# Patient Record
Sex: MALE | Race: WHITE | NOT HISPANIC OR LATINO | Employment: OTHER | URBAN - METROPOLITAN AREA
[De-identification: names, ages, dates, MRNs, and addresses within clinical notes are randomized per-mention and may not be internally consistent; named-entity substitution may affect disease eponyms.]

---

## 2018-03-09 ENCOUNTER — OFFICE VISIT (OUTPATIENT)
Dept: MEDICAL GROUP | Facility: PHYSICIAN GROUP | Age: 68
End: 2018-03-09
Payer: MEDICARE

## 2018-03-09 ENCOUNTER — HOSPITAL ENCOUNTER (OUTPATIENT)
Dept: LAB | Facility: MEDICAL CENTER | Age: 68
End: 2018-03-09
Attending: NURSE PRACTITIONER
Payer: MEDICARE

## 2018-03-09 VITALS
BODY MASS INDEX: 37.03 KG/M2 | WEIGHT: 250 LBS | DIASTOLIC BLOOD PRESSURE: 70 MMHG | HEART RATE: 65 BPM | OXYGEN SATURATION: 93 % | RESPIRATION RATE: 14 BRPM | TEMPERATURE: 98.4 F | HEIGHT: 69 IN | SYSTOLIC BLOOD PRESSURE: 126 MMHG

## 2018-03-09 DIAGNOSIS — R73.03 PRE-DIABETES: ICD-10-CM

## 2018-03-09 DIAGNOSIS — I25.2 HISTORY OF MI (MYOCARDIAL INFARCTION): ICD-10-CM

## 2018-03-09 DIAGNOSIS — I10 ESSENTIAL HYPERTENSION: ICD-10-CM

## 2018-03-09 DIAGNOSIS — G62.9 NEUROPATHY: ICD-10-CM

## 2018-03-09 DIAGNOSIS — E66.9 OBESITY (BMI 35.0-39.9 WITHOUT COMORBIDITY): ICD-10-CM

## 2018-03-09 DIAGNOSIS — G47.33 OSA ON CPAP: ICD-10-CM

## 2018-03-09 LAB
EST. AVERAGE GLUCOSE BLD GHB EST-MCNC: 117 MG/DL
HBA1C MFR BLD: 5.7 % (ref 0–5.6)

## 2018-03-09 PROCEDURE — 83036 HEMOGLOBIN GLYCOSYLATED A1C: CPT

## 2018-03-09 PROCEDURE — 36415 COLL VENOUS BLD VENIPUNCTURE: CPT

## 2018-03-09 PROCEDURE — 99204 OFFICE O/P NEW MOD 45 MIN: CPT | Performed by: NURSE PRACTITIONER

## 2018-03-09 RX ORDER — LISINOPRIL AND HYDROCHLOROTHIAZIDE 20; 12.5 MG/1; MG/1
1 TABLET ORAL DAILY
COMMUNITY
End: 2018-06-07 | Stop reason: SDUPTHER

## 2018-03-09 RX ORDER — CLOPIDOGREL BISULFATE 75 MG/1
75 TABLET ORAL DAILY
COMMUNITY
End: 2018-06-07 | Stop reason: SDUPTHER

## 2018-03-09 RX ORDER — CHLORAL HYDRATE 500 MG
1000 CAPSULE ORAL 2 TIMES DAILY
COMMUNITY

## 2018-03-09 ASSESSMENT — PATIENT HEALTH QUESTIONNAIRE - PHQ9: CLINICAL INTERPRETATION OF PHQ2 SCORE: 0

## 2018-03-09 NOTE — PROGRESS NOTES
"Choco Chavarria is a 67 y.o. male here today to establish care and for evaluation and management of:    HPI:  Retired [employer]   Essential hypertension  Taking lisinopril/hctz daily.  No chest pain, shortness of breath.  40+ year smoker.  Quit 2 years ago.     Pre-diabetes  Has been told that he was \"prediabetes\". Awaiting labs.     MANNY on CPAP  Started CPAP 2 years ago.  Feels much better with this treatment.  Sleeping better.     Neuropathy (CMS-[facility])  Reports improvement with B6.  Awaiting A1c    Obesity (BMI 35.0-39.9 without comorbidity) ([facility])  BMI 36.92 today. Not currently exercising. Having a difficult time with portion control. Recommended my [email or web address] and 150 minutes of exercise in a week.    History of MI (myocardial infarction)  Patient has vague history and difficulty explaining to me about his MI. He was started on eloquent. I have requested records. No current chest pain shortness of breath ankle edema reported.      Current medicines (including changes today)  Current Outpatient Prescriptions   Medication Sig Dispense Refill   • lisinopril-hydrochlorothiazide (PRINZIDE, ZESTORETIC) 20-12.5 MG per tablet Take 1 Tab by mouth every day.     • clopidogrel (PLAVIX) 75 MG Tab Take 75 mg by mouth every day.     • aspirin EC (ECOTRIN) 81 MG Tablet Delayed Response Take 81 mg by mouth every day.     • B Complex Vitamins (B COMPLEX PO) Take  by mouth.     • Omega-3 Fatty Acids (FISH OIL) 1000 MG Cap capsule Take 1,000 mg by mouth 2 Times a Day.       No current facility-administered medications for this visit.        He  has a past medical history of Heart attack and Hypertension.    He  has a past surgical history that includes appendectomy.    Social History   Substance Use Topics   • Smoking status: Former Smoker     Years: 40.00     Types: Cigarettes     Quit date: 3/9/2016   • Smokeless tobacco: Never Used   • Alcohol use Yes      Comment: rare       Social History     Social History Narrative   • No " "narrative on file       Family History   Problem Relation Age of Onset   • Cancer Mother    • Lung Disease Mother    • Psychiatry Father        Family Status   Relation Status   • Mother    • Father          ROS  As stated in hpi  All other systems reviewed and are negative     Objective:     Blood pressure 126/70, pulse 65, temperature 36.9 °C (98.4 °F), resp. rate 14, height 1.753 m (5' 9\"), weight 113.4 kg (250 lb), SpO2 93 %. Body mass index is 36.92 kg/m².  Physical Exam:    Constitutional: Alert, no distress.  Skin: Warm, dry, good turgor, no rashes in visible areas.  Eye: Equal, round and reactive, conjunctiva clear, lids normal.  ENMT: Lips without lesions, good dentition, oropharynx clear.  Neck: Trachea midline, no masses, no thyromegaly. No cervical or supraclavicular lymphadenopathy.  Respiratory: Unlabored respiratory effort, lungs clear to auscultation, no wheezes, no ronchi.  Cardiovascular: Normal S1, S2, no murmur, no edema.  Abdomen: Soft, non-tender, no masses, no hepatosplenomegaly.  Psych: Alert and oriented x3, normal affect and mood.        Assessment and Plan:   The following treatment plan was discussed    1. Essential hypertension  This is a new problem to me. Chronic. Stable. Blood pressure at goal. Continue lisinopril hydrochlorothiazide daily. ED precautions reviewed. Recheck request records from previous PCP. 2 check labs. Monitor and follow.    2. Pre-diabetes  This is a new problem to me. Chronic. Unknown status. Will check an A1c and then developed a lifestyle management plan based on that. Monitor and follow closely.  - HEMOGLOBIN A1C; Future    3. History of MI (myocardial infarction)  This is a new problem to me. Historical issue. Patient has a very vague history. He states he is on eloquent since that heart attack. I have requested records to try and understand the course of this historical issue. Patient is also taking a daily aspirin. Also taking fish oil. He is " not currently taking a statin. Monitor records when they come in.    4. MANNY on CPAP  This is a new problem to me. Chronic. Stable. Continue using CPAP nightly.    5. Neuropathy (CMS-HCC)  This is a new problem to me. Chronic. Stable. Improvement with B6 vitamins. No pain at night minimal numbness. Monitor and follow.    6. Obesity (BMI 35.0-39.9 without comorbidity)  This is a problem to me. Chronic. Ongoing issue. Discussed diet and exercise. Handout regarding my [email or web address] given. Recommended 150 minutes of weekly exercise.  - Patient identified as having weight management issue.  Appropriate orders and counseling given.      Records requested.  Followup: Return in about 1 year (around 3/9/2019) for Annual.

## 2018-03-09 NOTE — LETTER
[facility]  [facility]  [address]  [place] [address]  Fax: [phone]   Authorization for Release/Disclosure of   Protected Health Information   Name: LAWRENCE CHAVARRIA : 1950 SSN: [id number]   Address: [address] [place] [address] [place] [address] Phone:    [phone] (home)    I authorize the entity listed below to release/disclose the PHI below to:   [facility]   Provider or Entity Name:  Champ Demarco   Address   City, State, [place], [place] Phone:      Fax:     Reason for request: continuity of care   Information to be released:    [x  ] LAST COLONOSCOPY,  including any PATH REPORT and follow-up  [  ] LAST FIT/COLOGUARD RESULT [  ] LAST DEXA  [  ] LAST MAMMOGRAM  [  ] LAST PAP  [x  ] LAST LABS [  ] RETINA EXAM REPORT  [  ] IMMUNIZATION RECORDS  [ x ] Release all info      [  ] Check here and initial the line next to each item to release ALL health information INCLUDING  _____ Care and treatment for drug and / or alcohol abuse  _____ HIV testing, infection status, or AIDS  _____ Genetic Testing    DATES OF SERVICE OR TIME PERIOD TO BE DISCLOSED: _____________  I understand and acknowledge that:  * This Authorization may be revoked at any time by you in writing, except if your health information has already been used or disclosed.  * Your health information that will be used or disclosed as a result of you signing this authorization could be re-disclosed by the recipient. If this occurs, your re-disclosed health information may no longer be protected by State or Federal laws.  * You may refuse to sign this Authorization. Your refusal will not affect your ability to obtain treatment.  * This Authorization becomes effective upon signing and will  on (date) __________.      If no date is indicated, this Authorization will  one (1) year from the signature date.    Name: Lawrence Chavarria    Signature:   Date:     3/9/2018            PLEASE FAX REQUESTED RECORDS BACK TO: [phone]

## 2018-03-09 NOTE — ASSESSMENT & PLAN NOTE
Patient has vague history and difficulty explaining to me about his MI. He was started on eloquent. I have requested records. No current chest pain shortness of breath ankle edema reported.

## 2018-03-09 NOTE — ASSESSMENT & PLAN NOTE
BMI 36.92 today. Not currently exercising. Having a difficult time with portion control. Recommended my [email or web address] and 150 minutes of exercise in a week.

## 2018-03-09 NOTE — ASSESSMENT & PLAN NOTE
Taking lisinopril/hctz daily.  No chest pain, shortness of breath.  40+ year smoker.  Quit 2 years ago.

## 2018-03-12 ENCOUNTER — TELEPHONE (OUTPATIENT)
Dept: MEDICAL GROUP | Facility: PHYSICIAN GROUP | Age: 68
End: 2018-03-12

## 2018-03-12 NOTE — LETTER
"March 12, 2018         Choco Chavarria  [address] [facility] [address]        Choco  The A1c test came back at 5.7.  This is on the low end of \"pre-diabetes\", but is a good opportunity to make some lifestyle adjustments to improve your health.  Portion control, decreasing sweets and white carbohydrates, more veggies, daily exercising and weight loss are those hard things to do, but they will pay off.  Let me know if you have any questions or concerns.  [facility] Orders   HEMOGLOBIN A1C   Result Value Ref Range    Glycohemoglobin 5.7 (H) 0.0 - 5.6 %      Comment:      Increased risk for diabetes:  5.7 -6.4%  Diabetes:  >6.4%  Glycemic control for adults with diabetes:  <7.0%  The above interpretations are per ADA guidelines.  Diagnosis  of diabetes mellitus on the basis of elevated Hemoglobin A1c  should be confirmed by repeating the Hb A1c test.      Est Avg Glucose 117 mg/dL      Comment:      The eAG calculation is based on the A1c-Derived Daily Glucose  (ADAG) study.  See the ADA's website for additional information.           Electronically Signed  "

## 2018-03-12 NOTE — TELEPHONE ENCOUNTER
"----- Message from [facility] sent at 3/12/2018  8:39 AM PDT -----  Choco  The A1c test came back at 5.7.  This is on the low end of \"pre-diabetes\", but is a good opportunity to make some lifestyle adjustments to improve your health.  Portion control, decreasing sweets and white carbohydrates, more veggies, daily exercising and weight loss are those hard things to do, but they will pay off.  Let me know if you have any questions or concerns.  [facility]    "

## 2018-03-15 ENCOUNTER — PATIENT MESSAGE (OUTPATIENT)
Dept: MEDICAL GROUP | Facility: PHYSICIAN GROUP | Age: 68
End: 2018-03-15

## 2018-06-07 ENCOUNTER — OFFICE VISIT (OUTPATIENT)
Dept: MEDICAL GROUP | Facility: PHYSICIAN GROUP | Age: 68
End: 2018-06-07
Payer: MEDICARE

## 2018-06-07 VITALS
WEIGHT: 247 LBS | DIASTOLIC BLOOD PRESSURE: 82 MMHG | HEIGHT: 69 IN | HEART RATE: 68 BPM | BODY MASS INDEX: 36.58 KG/M2 | OXYGEN SATURATION: 94 % | TEMPERATURE: 98.2 F | SYSTOLIC BLOOD PRESSURE: 134 MMHG

## 2018-06-07 DIAGNOSIS — E78.5 DYSLIPIDEMIA: ICD-10-CM

## 2018-06-07 DIAGNOSIS — I25.2 HISTORY OF MI (MYOCARDIAL INFARCTION): ICD-10-CM

## 2018-06-07 DIAGNOSIS — I10 ESSENTIAL HYPERTENSION: ICD-10-CM

## 2018-06-07 DIAGNOSIS — Z12.5 SCREENING PSA (PROSTATE SPECIFIC ANTIGEN): ICD-10-CM

## 2018-06-07 DIAGNOSIS — R73.03 PRE-DIABETES: ICD-10-CM

## 2018-06-07 PROCEDURE — 99214 OFFICE O/P EST MOD 30 MIN: CPT | Performed by: FAMILY MEDICINE

## 2018-06-07 RX ORDER — CLOPIDOGREL BISULFATE 75 MG/1
75 TABLET ORAL DAILY
Qty: 90 TAB | Refills: 0 | Status: SHIPPED | OUTPATIENT
Start: 2018-06-07 | End: 2018-12-09 | Stop reason: SDUPTHER

## 2018-06-07 RX ORDER — LISINOPRIL AND HYDROCHLOROTHIAZIDE 20; 12.5 MG/1; MG/1
1 TABLET ORAL DAILY
Qty: 90 TAB | Refills: 0 | Status: SHIPPED | OUTPATIENT
Start: 2018-06-07 | End: 2018-09-04 | Stop reason: SDUPTHER

## 2018-06-07 NOTE — ASSESSMENT & PLAN NOTE
This problem is new to me; patient reports that he was told he had a heart attack and was placed on Plavix as a preventative anticoagulant. Currently denies any chest pain or heart palpitations

## 2018-06-07 NOTE — PROGRESS NOTES
Subjective:   Choco Chavarria is a 67 y.o. male here today for hypertension, prediabetes, history of MI, elevated lipids    Essential hypertension  This problem is new to me; patient currently taking lisinopril/hydrochlorothiazide 20/12.5 mg every other day; currently denies any headache, dizziness, chest pain; current blood pressure is slightly above the recommended range    Pre-diabetes  This problem is new to me; review of patient's chart shows that his most recent hemoglobin A1c is 5.7. Patient reports that he is aware of this number and instructed to make some lifestyle changes.    History of MI (myocardial infarction)  This problem is new to me; patient reports that he was told he had a heart attack and was placed on Plavix as a preventative anticoagulant. Currently denies any chest pain or heart palpitations    Dyslipidemia  This problem is new to me. Patient reports that he is uncertain whether or not he has had any issues of elevated lipids in the past. Given that he has risk factors of previous heart attack and is currently being treated for high blood pressure, patient should be evaluated for lipid issues and be treated appropriately     Patient usually sees ARGELIA Ponce    Current medicines (including changes today)  Current Outpatient Prescriptions   Medication Sig Dispense Refill   • clopidogrel (PLAVIX) 75 MG Tab Take 1 Tab by mouth every day. 90 Tab 0   • lisinopril-hydrochlorothiazide (PRINZIDE, ZESTORETIC) 20-12.5 MG per tablet Take 1 Tab by mouth every day. 90 Tab 0   • aspirin EC (ECOTRIN) 81 MG Tablet Delayed Response Take 81 mg by mouth every day.     • B Complex Vitamins (B COMPLEX PO) Take  by mouth.     • Omega-3 Fatty Acids (FISH OIL) 1000 MG Cap capsule Take 1,000 mg by mouth 2 Times a Day.       No current facility-administered medications for this visit.      He  has a past medical history of Heart attack (HCC) and Hypertension.    ROS   No chest pain, no shortness of breath, no abdominal  "pain       Objective:     Blood pressure 134/82, pulse 68, temperature 36.8 °C (98.2 °F), height 1.753 m (5' 9\"), weight 112 kg (247 lb), SpO2 94 %. Body mass index is 36.48 kg/m².   Physical Exam:  Alert, oriented in no acute distress.  Eye contact is good, speech goal directed, affect calm  HEENT: conjunctiva non-injected, sclera non-icteric.  Pinna normal. TM pearly gray.   Oral mucous membranes pink and moist with no lesions.  Neck No adenopathy or masses in the neck or supraclavicular regions.  Lungs: clear to auscultation bilaterally with good excursion.  CV: regular rate and rhythm. Mild systolic ejection murmur  Abdomen: soft, nontender, No CVAT  Ext: no edema, color normal, vascularity normal, temperature normal        Assessment and Plan:   The following treatment plan was discussed     1. History of MI (myocardial infarction)      Stable. Continue Plavix; refill provided; monitor   2. Essential hypertension  COMP METABOLIC PANEL    MICROALBUMIN CREAT RATIO URINE    Stable. Patient will continue current medication every other day; reevaluation in 4 months by PCP   3. Pre-diabetes  HEMOGLOBIN A1C    Stable. Labs ordered for reevaluation in 4 months   4. Dyslipidemia  LIPID PROFILE    Stable. Labs ordered for reevaluation in 4 months   5. Screening PSA (prostate specific antigen)  PROSTATE SPECIFIC AG DIAGNOSTIC    Screening tests for further evaluation given patient's age       Followup: Return in about 4 months (around 10/7/2018) for lab/medication review, Short.            "

## 2018-06-07 NOTE — ASSESSMENT & PLAN NOTE
This problem is new to me. Patient reports that he is uncertain whether or not he has had any issues of elevated lipids in the past. Given that he has risk factors of previous heart attack and is currently being treated for high blood pressure, patient should be evaluated for lipid issues and be treated appropriately

## 2018-06-07 NOTE — ASSESSMENT & PLAN NOTE
This problem is new to me; patient currently taking lisinopril/hydrochlorothiazide 20/12.5 mg every other day; currently denies any headache, dizziness, chest pain; current blood pressure is slightly above the recommended range

## 2018-06-07 NOTE — ASSESSMENT & PLAN NOTE
This problem is new to me; review of patient's chart shows that his most recent hemoglobin A1c is 5.7. Patient reports that he is aware of this number and instructed to make some lifestyle changes.

## 2018-09-04 RX ORDER — LISINOPRIL AND HYDROCHLOROTHIAZIDE 20; 12.5 MG/1; MG/1
TABLET ORAL
Qty: 90 TAB | Refills: 0 | Status: SHIPPED | OUTPATIENT
Start: 2018-09-04 | End: 2019-03-11 | Stop reason: SDUPTHER

## 2018-09-05 NOTE — TELEPHONE ENCOUNTER
Requested Prescriptions     Signed Prescriptions Disp Refills   • lisinopril-hydrochlorothiazide (PRINZIDE, ZESTORETIC) 20-12.5 MG per tablet 90 Tab 0     Sig: TAKE ONE TABLET BY MOUTH EVERY DAY     Authorizing Provider: SALLIE SETH A.P.R.N.

## 2018-10-04 ENCOUNTER — HOSPITAL ENCOUNTER (OUTPATIENT)
Dept: LAB | Facility: MEDICAL CENTER | Age: 68
End: 2018-10-04
Attending: FAMILY MEDICINE
Payer: MEDICARE

## 2018-10-04 DIAGNOSIS — E78.5 DYSLIPIDEMIA: ICD-10-CM

## 2018-10-04 DIAGNOSIS — R73.03 PRE-DIABETES: ICD-10-CM

## 2018-10-04 DIAGNOSIS — I10 ESSENTIAL HYPERTENSION: ICD-10-CM

## 2018-10-04 DIAGNOSIS — Z12.5 SCREENING PSA (PROSTATE SPECIFIC ANTIGEN): ICD-10-CM

## 2018-10-04 LAB
ALBUMIN SERPL BCP-MCNC: 4.2 G/DL (ref 3.2–4.9)
ALBUMIN/GLOB SERPL: 1.1 G/DL
ALP SERPL-CCNC: 84 U/L (ref 30–99)
ALT SERPL-CCNC: 25 U/L (ref 2–50)
ANION GAP SERPL CALC-SCNC: 10 MMOL/L (ref 0–11.9)
AST SERPL-CCNC: 19 U/L (ref 12–45)
BILIRUB SERPL-MCNC: 0.6 MG/DL (ref 0.1–1.5)
BUN SERPL-MCNC: 21 MG/DL (ref 8–22)
CALCIUM SERPL-MCNC: 10.3 MG/DL (ref 8.5–10.5)
CHLORIDE SERPL-SCNC: 104 MMOL/L (ref 96–112)
CHOLEST SERPL-MCNC: 173 MG/DL (ref 100–199)
CO2 SERPL-SCNC: 24 MMOL/L (ref 20–33)
CREAT SERPL-MCNC: 0.89 MG/DL (ref 0.5–1.4)
CREAT UR-MCNC: 144.2 MG/DL
EST. AVERAGE GLUCOSE BLD GHB EST-MCNC: 117 MG/DL
GLOBULIN SER CALC-MCNC: 3.7 G/DL (ref 1.9–3.5)
GLUCOSE SERPL-MCNC: 87 MG/DL (ref 65–99)
HBA1C MFR BLD: 5.7 % (ref 0–5.6)
HDLC SERPL-MCNC: 39 MG/DL
LDLC SERPL CALC-MCNC: 107 MG/DL
MICROALBUMIN UR-MCNC: 2 MG/DL
MICROALBUMIN/CREAT UR: 14 MG/G (ref 0–30)
POTASSIUM SERPL-SCNC: 4.2 MMOL/L (ref 3.6–5.5)
PROT SERPL-MCNC: 7.9 G/DL (ref 6–8.2)
PSA SERPL-MCNC: 1.32 NG/ML (ref 0–4)
SODIUM SERPL-SCNC: 138 MMOL/L (ref 135–145)
TRIGL SERPL-MCNC: 137 MG/DL (ref 0–149)

## 2018-10-04 PROCEDURE — 80053 COMPREHEN METABOLIC PANEL: CPT

## 2018-10-04 PROCEDURE — 80061 LIPID PANEL: CPT

## 2018-10-04 PROCEDURE — 83036 HEMOGLOBIN GLYCOSYLATED A1C: CPT | Mod: GA

## 2018-10-04 PROCEDURE — 82570 ASSAY OF URINE CREATININE: CPT

## 2018-10-04 PROCEDURE — 36415 COLL VENOUS BLD VENIPUNCTURE: CPT

## 2018-10-04 PROCEDURE — 84153 ASSAY OF PSA TOTAL: CPT | Mod: GA

## 2018-10-04 PROCEDURE — 82043 UR ALBUMIN QUANTITATIVE: CPT

## 2018-10-10 ENCOUNTER — OFFICE VISIT (OUTPATIENT)
Dept: MEDICAL GROUP | Facility: PHYSICIAN GROUP | Age: 68
End: 2018-10-10
Payer: MEDICARE

## 2018-10-10 VITALS
RESPIRATION RATE: 14 BRPM | WEIGHT: 250 LBS | HEIGHT: 69 IN | BODY MASS INDEX: 37.03 KG/M2 | HEART RATE: 86 BPM | SYSTOLIC BLOOD PRESSURE: 128 MMHG | TEMPERATURE: 98.2 F | OXYGEN SATURATION: 92 % | DIASTOLIC BLOOD PRESSURE: 56 MMHG

## 2018-10-10 DIAGNOSIS — G62.9 NEUROPATHY: ICD-10-CM

## 2018-10-10 DIAGNOSIS — E78.5 DYSLIPIDEMIA: ICD-10-CM

## 2018-10-10 DIAGNOSIS — Z23 NEED FOR VACCINATION: ICD-10-CM

## 2018-10-10 DIAGNOSIS — R73.03 PRE-DIABETES: ICD-10-CM

## 2018-10-10 DIAGNOSIS — E66.9 OBESITY (BMI 35.0-39.9 WITHOUT COMORBIDITY): ICD-10-CM

## 2018-10-10 DIAGNOSIS — I10 ESSENTIAL HYPERTENSION: ICD-10-CM

## 2018-10-10 DIAGNOSIS — I25.2 HISTORY OF MI (MYOCARDIAL INFARCTION): ICD-10-CM

## 2018-10-10 PROCEDURE — 99214 OFFICE O/P EST MOD 30 MIN: CPT | Mod: 25 | Performed by: NURSE PRACTITIONER

## 2018-10-10 PROCEDURE — 90662 IIV NO PRSV INCREASED AG IM: CPT | Performed by: NURSE PRACTITIONER

## 2018-10-10 PROCEDURE — G0008 ADMIN INFLUENZA VIRUS VAC: HCPCS | Performed by: NURSE PRACTITIONER

## 2018-10-10 PROCEDURE — G0009 ADMIN PNEUMOCOCCAL VACCINE: HCPCS | Performed by: NURSE PRACTITIONER

## 2018-10-10 PROCEDURE — 90670 PCV13 VACCINE IM: CPT | Performed by: NURSE PRACTITIONER

## 2018-10-10 NOTE — ASSESSMENT & PLAN NOTE
Reports that neuropathy started when he started a statin. No reported pain at night.  States it is getting numb.  No pain in legs when walking.

## 2018-10-10 NOTE — PROGRESS NOTES
Chief Complaint   Patient presents with   • Follow-Up   • Results     labs    • Hypertension       Subjective:   Lawrence Major is a 67 y.o. male here today for evaluation and management of:    Essential hypertension  /56.  Currently taking lisinopril/hctz.  Taking every other day.     History of MI (myocardial infarction)  No reported swelling of ankles/shortness of breath reported.  Not started on beta blocker at that time.  Taking plavix and baby asa.     Neuropathy (CMS-HCC) ([facility])  Reports that neuropathy started when he started a statin. No reported pain at night.  States it is getting numb.  No pain in legs when walking.     Pre-diabetes  A1c 5.7 today.  Discussed diet, weight loss and exercise.  Goal to lose 12 pounds in the next 6 months.     Obesity (BMI 35.0-39.9 without comorbidity) ([facility])  BMi 36.92.  Discussed diet, exercise and weight loss.     Dyslipidemia  Cholesterol and triglycerides WNL.  HDL slightly low and LDL slightly elevated.  Discussed diet exercise and weight loss  Results for LAWRENCE MAJOR (MRN [id number]) as of 10/10/2018 10:21   Ref. Range 10/4/2018 13:52   Cholesterol,Tot Latest Ref Range: 100 - 199 mg/dL 173   Triglycerides Latest Ref Range: 0 - 149 mg/dL 137   HDL Latest Ref Range: >=40 mg/dL 39 (A)   LDL Latest Ref Range: <100 mg/dL 107 (H)          Current medicines (including changes today)  Current Outpatient Prescriptions   Medication Sig Dispense Refill   • lisinopril-hydrochlorothiazide (PRINZIDE, ZESTORETIC) 20-12.5 MG per tablet TAKE ONE TABLET BY MOUTH EVERY DAY 90 Tab 0   • clopidogrel (PLAVIX) 75 MG Tab Take 1 Tab by mouth every day. 90 Tab 0   • aspirin EC (ECOTRIN) 81 MG Tablet Delayed Response Take 81 mg by mouth every day.     • B Complex Vitamins (B COMPLEX PO) Take  by mouth.     • Omega-3 Fatty Acids (FISH OIL) 1000 MG Cap capsule Take 1,000 mg by mouth 2 Times a Day.       No current facility-administered medications for this visit.      He  has a past medical  "history of Heart attack (HCC) and Hypertension.    ROS as stated in hpi  No chest pain, no shortness of breath, no abdominal pain       Objective:     Blood pressure 128/56, pulse 86, temperature 36.8 °C (98.2 °F), temperature source Temporal, resp. rate 14, height 1.753 m (5' 9\"), weight 113.4 kg (250 lb), SpO2 92 %. Body mass index is 36.92 kg/m².   Physical Exam:  Constitutional: Alert, no distress.  Skin: Warm, dry, good turgor,no cyanosis, no rashes in visible areas.  Eye: Equal, round and reactive, conjunctiva clear, lids normal.  Ears: No tenderness, no discharge.  External canals are without any significant edema or erythema.   Gross auditory acuity is intact.  Nose: symmetrical without tenderness, no discharge.  Mouth/Throat: lips without lesion.  Oropharynx clear.  Neck: Trachea midline, no masses, no obvious thyroid enlargement.. No cervical or supraclavicular lymphadenopathy. Range of motion within normal limits.  Neuro: Cranial nerves 2-12 grossly intact.  No sensory deficit.  Respiratory: Unlabored respiratory effort, lungs clear to auscultation, no wheezes, no ronchi.  Cardiovascular: Normal S1, S2, no murmur, no edema.  Psych: Alert and oriented x3, normal affect and mood and judgement.        Assessment and Plan:   The following treatment plan was discussed    1. Need for vaccination  Due for vaccines.  No acute illness. Consent obtained.  I have placed the below orders and discussed them with an approved delegating provider.  The MA is performing the below orders under the direction of Dr. Gonzalez D.O. .    - INFLUENZA VACCINE, HIGH DOSE (65+ ONLY)  - Pneumococcal Conjugate Vaccine 13-Valent    2. Essential hypertension  Chronic, ongoing at goal.  Continue with lisinopril/hctz every other day.  Discussed exercise, weight loss role in controlling HTN.  Return in 6 months.     3. History of MI (myocardial infarction)  Chronic, ongoing. Stable.  Continue with plavix and ASA.  ED precautions reviewed.  " Monitor and follow.     4. Neuropathy (HCC)  Chronic, ongoing. Stable. Red flag warnings reviewed.  Monitor and follow.     5. Pre-diabetes  Chronic, A1c 5.7.  Discussed pre diabetes including weight loss, diet and exercise.  Return in 6 months for recheck of A1c and lifestyle adjustments.     6. Obesity (BMI 35.0-39.9 without comorbidity) (HCC)  Chronic, ongoing.  BMI 36.9.  Discussed diet, exercise and weight loss. Monitor and follow.     7. Dyslipidemia  Chronic, ongoing. Elevated LDL.  Discussed role of diet, exercise and weight loss.  Return in 6 months.  Monitor and follow.       Followup: Return in about 6 months (around 4/10/2019) for a1c.         Educated in proper administration of medication(s) ordered today including safety, possible SE, risks, benefits, rationale and alternatives to therapy.     Please note that this dictation was created using voice recognition software. I have made every reasonable attempt to correct obvious errors, but I expect that there are errors of grammar and possibly content that I did not discover before finalizing the note.

## 2018-10-10 NOTE — ASSESSMENT & PLAN NOTE
No reported swelling of ankles/shortness of breath reported.  Not started on beta blocker at that time.  Taking plavix and baby asa.

## 2018-10-10 NOTE — ASSESSMENT & PLAN NOTE
A1c 5.7 today.  Discussed diet, weight loss and exercise.  Goal to lose 12 pounds in the next 6 months.

## 2018-10-10 NOTE — ASSESSMENT & PLAN NOTE
Cholesterol and triglycerides WNL.  HDL slightly low and LDL slightly elevated.  Discussed diet exercise and weight loss  Results for LAWRENCE MAJOR (MRN [id number]) as of 10/10/2018 10:21   Ref. Range 10/4/2018 13:52   Cholesterol,Tot Latest Ref Range: 100 - 199 mg/dL 173   Triglycerides Latest Ref Range: 0 - 149 mg/dL 137   HDL Latest Ref Range: >=40 mg/dL 39 (A)   LDL Latest Ref Range: <100 mg/dL 107 (H)

## 2018-12-10 RX ORDER — CLOPIDOGREL BISULFATE 75 MG/1
TABLET ORAL
Qty: 90 TAB | Refills: 1 | Status: SHIPPED | OUTPATIENT
Start: 2018-12-10 | End: 2019-05-26 | Stop reason: SDUPTHER

## 2018-12-10 NOTE — TELEPHONE ENCOUNTER
Requested Prescriptions     Signed Prescriptions Disp Refills   • clopidogrel (PLAVIX) 75 MG Tab 90 Tab 1     Sig: TAKE ONE TABLET BY MOUTH EVERY DAY     Authorizing Provider: SALLIE SETH A.P.R.N.

## 2019-03-12 RX ORDER — LISINOPRIL AND HYDROCHLOROTHIAZIDE 20; 12.5 MG/1; MG/1
TABLET ORAL
Qty: 90 TAB | Refills: 0 | Status: SHIPPED | OUTPATIENT
Start: 2019-03-12 | End: 2019-05-26 | Stop reason: SDUPTHER

## 2019-03-28 ENCOUNTER — OFFICE VISIT (OUTPATIENT)
Dept: MEDICAL GROUP | Facility: PHYSICIAN GROUP | Age: 69
End: 2019-03-28
Payer: MEDICARE

## 2019-03-28 VITALS
BODY MASS INDEX: 36.14 KG/M2 | HEIGHT: 69 IN | HEART RATE: 66 BPM | TEMPERATURE: 98.2 F | WEIGHT: 244 LBS | RESPIRATION RATE: 14 BRPM | OXYGEN SATURATION: 95 % | DIASTOLIC BLOOD PRESSURE: 74 MMHG | SYSTOLIC BLOOD PRESSURE: 120 MMHG

## 2019-03-28 DIAGNOSIS — E66.9 OBESITY (BMI 35.0-39.9 WITHOUT COMORBIDITY): ICD-10-CM

## 2019-03-28 DIAGNOSIS — I25.2 HISTORY OF MI (MYOCARDIAL INFARCTION): ICD-10-CM

## 2019-03-28 DIAGNOSIS — R73.09 ELEVATED HEMOGLOBIN A1C: ICD-10-CM

## 2019-03-28 DIAGNOSIS — I10 ESSENTIAL HYPERTENSION: ICD-10-CM

## 2019-03-28 DIAGNOSIS — G47.33 OSA ON CPAP: ICD-10-CM

## 2019-03-28 DIAGNOSIS — Z86.010 HISTORY OF COLONIC POLYPS: ICD-10-CM

## 2019-03-28 PROBLEM — Z86.0100 HISTORY OF COLONIC POLYPS: Status: ACTIVE | Noted: 2019-03-28

## 2019-03-28 LAB
HBA1C MFR BLD: 5.4 % (ref 0–5.6)
INT CON NEG: NEGATIVE
INT CON POS: POSITIVE

## 2019-03-28 PROCEDURE — 83036 HEMOGLOBIN GLYCOSYLATED A1C: CPT | Performed by: NURSE PRACTITIONER

## 2019-03-28 PROCEDURE — 99213 OFFICE O/P EST LOW 20 MIN: CPT | Performed by: NURSE PRACTITIONER

## 2019-03-28 ASSESSMENT — PATIENT HEALTH QUESTIONNAIRE - PHQ9: CLINICAL INTERPRETATION OF PHQ2 SCORE: 0

## 2019-03-28 NOTE — LETTER
formerly [facility]  [facility]  [address]  [place] [address]  Fax: [phone]   Authorization for Release/Disclosure of   Protected Health Information   Name: LAWRENCE CHAVARRIA : 1950 SSN: [id number]   Address: [address] [place] [address] [place] [address] Phone:    [phone] (home)    I authorize the entity listed below to release/disclose the PHI below to:   [facility]   Provider or Entity Name:  Dr. Lawler or Mode   Address   City, State, [place], [place] Phone:      Fax:     Reason for request: continuity of care   Information to be released:    [ x ] LAST COLONOSCOPY,  including any PATH REPORT and follow-up  [  ] LAST FIT/COLOGUARD RESULT [  ] LAST DEXA  [  ] LAST MAMMOGRAM  [  ] LAST PAP  [  ] LAST LABS [  ] RETINA EXAM REPORT  [  ] IMMUNIZATION RECORDS  [  ] Release all info      [  ] Check here and initial the line next to each item to release ALL health information INCLUDING  _____ Care and treatment for drug and / or alcohol abuse  _____ HIV testing, infection status, or AIDS  _____ Genetic Testing    DATES OF SERVICE OR TIME PERIOD TO BE DISCLOSED: _____________  I understand and acknowledge that:  * This Authorization may be revoked at any time by you in writing, except if your health information has already been used or disclosed.  * Your health information that will be used or disclosed as a result of you signing this authorization could be re-disclosed by the recipient. If this occurs, your re-disclosed health information may no longer be protected by State or Federal laws.  * You may refuse to sign this Authorization. Your refusal will not affect your ability to obtain treatment.  * This Authorization becomes effective upon signing and will  on (date) __________.      If no date is indicated, this Authorization will  one (1) year from the signature date.    Name: Lawrence Chavarria    Signature:   Date:     3/28/2019            PLEASE FAX REQUESTED RECORDS BACK TO: [phone]

## 2019-03-28 NOTE — PROGRESS NOTES
"Chief Complaint   Patient presents with   • Follow-Up   • Hypertension   • Elevated Glucose       Subjective:   Choco Chavarria is a 68 y.o. male here today for evaluation and management of:    Essential hypertension  Taking lisinopril/hctz every other day.  BP at goal 120/74.  No chest pain, shortness of breath.     History of MI (myocardial infarction)  Taking 81 mg aspirin and plavix.  Cardiologist in [place], [place] is following.     MANNY on CPAP  Still wearing CPAP.    Obesity (BMI 35.0-39.9 without comorbidity) ([facility])  BMI today is 36.03.  Has lost 6 pounds and is working on vegg[employer].     History of colonic polyps  Reports he has been on the 5 year plan.  Benign.  Will request records today    Pre-diabetes  Today your A1c 5.4, normal range.            Current medicines (including changes today)  Current Outpatient Prescriptions   Medication Sig Dispense Refill   • lisinopril-hydrochlorothiazide (PRINZIDE, ZESTORETIC) 20-12.5 MG per tablet TAKE ONE TABLET BY MOUTH EVERY DAY 90 Tab 0   • clopidogrel (PLAVIX) 75 MG Tab TAKE ONE TABLET BY MOUTH EVERY DAY 90 Tab 1   • aspirin EC (ECOTRIN) 81 MG Tablet Delayed Response Take 81 mg by mouth every day.     • B Complex Vitamins (B COMPLEX PO) Take  by mouth.     • Omega-3 Fatty Acids (FISH OIL) 1000 MG Cap capsule Take 1,000 mg by mouth 2 Times a Day.       No current facility-administered medications for this visit.      He  has a past medical history of Elevated hemoglobin A1c; Heart attack (HCC); Hyperlipidemia; and Hypertension.    ROS as stated in hpi  No chest pain, no shortness of breath, no abdominal pain       Objective:     Blood pressure 120/74, pulse 66, temperature 36.8 °C (98.2 °F), temperature source Temporal, resp. rate 14, height 1.753 m (5' 9\"), weight 110.7 kg (244 lb), SpO2 95 %. Body mass index is 36.03 kg/m².   Physical Exam:  Constitutional: Alert, no distress.  Skin: Warm, dry, good turgor,no cyanosis, no rashes in visible areas.  Eye: Equal, round and " "reactive, conjunctiva clear, lids normal.  Ears: No tenderness, no discharge.  External canals are without any significant edema or erythema.  Tympanic membranes are without any inflammation, no effusion.  Gross auditory acuity is intact.  Nose: symmetrical without tenderness, no discharge.  Mouth/Throat: lips without lesion.  Oropharynx clear.  Throat without erythema, exudates or tonsillar enlargement.  Neck: Trachea midline, no masses, no obvious thyroid enlargement.. No cervical or supraclavicular lymphadenopathy. Range of motion within normal limits.  Neuro: Cranial nerves 2-12 grossly intact.  No sensory deficit.  Respiratory: Unlabored respiratory effort, lungs clear to auscultation, no wheezes, no ronchi.  Cardiovascular: Normal S1, S2, no murmur, no edema.  Abdomen: Soft, non-tender, no masses, no guarding,  no hepatosplenomegaly.  Psych: Alert and oriented x3, normal affect and mood and judgement.        Assessment and Plan:   The following treatment plan was discussed    1. Essential hypertension  Chronic, ongoing. Stable. At goal.  Continue with lisinopril/hctz and diet/exercise/weight loss plan.  Monitor and follow.     2. History of MI (myocardial infarction)  Chronic, ongoing.  Followed by cardiologist in [place].  Yearly appointment.  Stable.  Continue asa and plavix.   Red flags reviewed.     3. MANNY on CPAP  Chronic, ongoing. Stable.  Referral to pulmonology for updated supplies.  Continue cpap nightly.   - REFERRAL TO PULMONOLOGY    4. Obesity (BMI 35.0-39.9 without comorbidity) (HCC)  Chronic, ongoing, improving.  Continue diet and exercise.     5. History of colonic polyps  This is a new historical issue to me.  Chronic, request records from [place].  Patient is on \"5 year recall\" plan.  Monitor.       Followup: Return in about 1 year (around 3/28/2020) for Annual.         Educated in proper administration of medication(s) ordered today including safety, possible SE, risks, benefits, rationale and " alternatives to therapy.     Please note that this dictation was created using voice recognition software. I have made every reasonable attempt to correct obvious errors, but I expect that there are errors of grammar and possibly content that I did not discover before finalizing the note.

## 2019-04-24 ENCOUNTER — SLEEP CENTER VISIT (OUTPATIENT)
Dept: SLEEP MEDICINE | Facility: MEDICAL CENTER | Age: 69
End: 2019-04-24
Payer: MEDICARE

## 2019-04-24 ENCOUNTER — SLEEP STUDY (OUTPATIENT)
Dept: SLEEP MEDICINE | Facility: MEDICAL CENTER | Age: 69
End: 2019-04-24
Attending: INTERNAL MEDICINE
Payer: MEDICARE

## 2019-04-24 VITALS
DIASTOLIC BLOOD PRESSURE: 62 MMHG | RESPIRATION RATE: 16 BRPM | OXYGEN SATURATION: 96 % | WEIGHT: 245 LBS | SYSTOLIC BLOOD PRESSURE: 130 MMHG | BODY MASS INDEX: 36.29 KG/M2 | HEIGHT: 69 IN | HEART RATE: 59 BPM

## 2019-04-24 DIAGNOSIS — G47.33 OSA ON CPAP: ICD-10-CM

## 2019-04-24 DIAGNOSIS — I25.2 HISTORY OF MI (MYOCARDIAL INFARCTION): ICD-10-CM

## 2019-04-24 DIAGNOSIS — I10 ESSENTIAL HYPERTENSION: ICD-10-CM

## 2019-04-24 PROCEDURE — 95811 POLYSOM 6/>YRS CPAP 4/> PARM: CPT | Performed by: INTERNAL MEDICINE

## 2019-04-24 PROCEDURE — 99204 OFFICE O/P NEW MOD 45 MIN: CPT | Mod: 25 | Performed by: INTERNAL MEDICINE

## 2019-04-24 RX ORDER — ZOLPIDEM TARTRATE 5 MG/1
5 TABLET ORAL NIGHTLY PRN
Qty: 3 TAB | Refills: 0 | Status: SHIPPED | OUTPATIENT
Start: 2019-04-24 | End: 2019-04-26

## 2019-04-24 NOTE — PROGRESS NOTES
Chief Complaint   Patient presents with   • New Patient       HPI: This patient is a pleasant 68 y.o. Male who is referred by Dorothy Kim, PCP, for sleep apnea management.  He was diagnosed with MANNY in [place], [place] 16 years ago and prescribed CPAP: 12 cm of water.  He has been using his original CPAP machine which he feels works well, however needs new supplies.  His original sleep study results are unavailable.  His compliance card has not download data since 2016 and does not have AHI data.  He uses a fullface mask and has been purchasing supplies online.  In terms of sleep habits, he goes to bed around 11:30 PM, gets to sleep within 10 minutes and generally sleeps through the night. Occasionally he wakes up in the early morning and cannot resume sleep.  He gets up by 8 AM, feeling rested.  He denies any significant daytime hypersomnolence although sometimes nods off while reading in the evenings.  His BMI is 36.    Past Medical History:   Diagnosis Date   • Apnea, sleep    • Chickenpox    • Daytime sleepiness    • Elevated hemoglobin A1c    • Frequent urination    • Gasping for breath    • Heart attack (HCC)    • Hyperlipidemia    • Hypertension    • Influenza    • Mumps    • Obesity    • Ringing in ears    • Sleep apnea    • Snoring    • Wears glasses    • Weight loss        Social History     Social History   • Marital status:      Spouse name: N/A   • Number of children: N/A   • Years of education: N/A     Occupational History   • Not on file.     Social History Main Topics   • Smoking status: Former Smoker     Years: 40.00     Types: Cigarettes     Quit date: 3/9/2016   • Smokeless tobacco: Never Used   • Alcohol use Yes      Comment: rare   • Drug use: No   • Sexual activity: Yes     Partners: Female     Other Topics Concern   • Not on file     Social History Narrative   • No narrative on file       Family History   Problem Relation Age of Onset   • Cancer Mother    • Lung Disease Mother    •  "Psychiatry Father        Current Outpatient Prescriptions on File Prior to Visit   Medication Sig Dispense Refill   • lisinopril-hydrochlorothiazide (PRINZIDE, ZESTORETIC) 20-12.5 MG per tablet TAKE ONE TABLET BY MOUTH EVERY DAY 90 Tab 0   • clopidogrel (PLAVIX) 75 MG Tab TAKE ONE TABLET BY MOUTH EVERY DAY 90 Tab 1   • aspirin EC (ECOTRIN) 81 MG Tablet Delayed Response Take 81 mg by mouth every day.     • B Complex Vitamins (B COMPLEX PO) Take  by mouth.     • Omega-3 Fatty Acids (FISH OIL) 1000 MG Cap capsule Take 1,000 mg by mouth 2 Times a Day.       No current facility-administered medications on file prior to visit.        Allergies: Patient has no known allergies.    ROS:   Constitutional: Denies fevers, chills, night sweats, fatigue or weight loss  Eyes: Denies vision loss, pain, drainage, double vision  Ears, Nose, Throat: Denies earache, difficulty hearing, tinnitus, nasal congestion, hoarseness  Cardiovascular: Denies chest pain, tightness, palpitations, orthopnea or edema  Respiratory: Denies shortness of breath, cough, wheezing, hemoptysis  Sleep: Denies daytime sleepiness, snoring, apneas, insomnia, morning headaches  GI: Denies heartburn, dysphagia, nausea, abdominal pain, diarrhea or constipation  : Denies frequent urination, hematuria, discharge or painful urination  Musculoskeletal: Denies back pain, painful joints, sore muscles  Neurological: Denies weakness or headaches  Skin: No rashes    /62 (BP Location: Left arm, Patient Position: Sitting, BP Cuff Size: Adult)   Pulse (!) 59   Resp 16   Ht 1.753 m (5' 9\")   Wt 111.1 kg (245 lb)   SpO2 96%     Physical Exam:  Appearance: Well-nourished, well-developed, in no acute distress  HEENT: Normocephalic, atraumatic, white sclera, PERRLA, oropharynx clear, Mallampati 3  Neck: No adenopathy or masses  Respiratory: no intercostal retractions or accessory muscle use  Lungs auscultation: Clear to auscultation bilaterally  Cardiovascular: Regular " "rate rhythm. No murmurs, rubs or gallops.  No LE edema  Abdomen: soft, nondistended  Gait: Normal  Digits: No clubbing, cyanosis  Motor: No focal deficits  Orientation: Oriented to time, person and place    Diagnosis:  1. MANNY on CPAP  Polysomnography, 4 or More    zolpidem (AMBIEN) 5 MG Tab   2. Essential hypertension     3. BMI 36.0-36.9,adult  OBESITY COUNSELING (No Charge): Patient identified as having weight management issue.  Appropriate orders and counseling given.   4. History of MI (myocardial infarction)         Plan:  The patient has successfully been using CPAP therapy since 2003.  His CPAP is set at 12 cm of water.  He clinically shows good response to CPAP therapy however his machine is outdated and requires replacement.  He would like to establish with a DME for supplies.  As his original sleep study was in 2003, he requires an updated polysomnogram for evaluation of MANNY and accurate calibration of CPAP pressures.  Patient's body mass index is 36.18 kg/m². Exercise and nutrition counseling were performed at this visit.  Return for after sleep study.      Answers for HPI/ROS submitted by the patient on 4/24/2019   Year of your last physical exam: 2001  Results of exam: CPAP Machine for asleep Apnia  Occupation : Retired  Height: 5' 9\"  Current weight: 235  6 months ago: 250  At age 20: 185  What is the reason for your visit today?: Orientation and to check my CPAP Machine  Name of person referring you to the Sleep Center: Alverto Kim  Have you ever been hospitalized?: Yes  Reason, year, and hospital in which you were hospitalized:: [facility]  Have you ever had problems with anesthesia?: No  Have you experienced post-operative delirium?: No  Any complications with surgery?: No  What year did you receive your last Flu shot?: This year 2019  What year did you receive you last Pneumonia shot?: Did not receive this shot  Have you had a TB skin test? If so, please list the year and result:: " No  Have you had Allergy skin testing? If so, please list the year and result:: No  Please briefly describe your sleep problem and how old you were when it began.: Snored and did not sleep well - 2014  How does this affect your daily life and activities? Please also rate how serious of a problem this is (1 = Not at all, 10 = Very Serious).: Always tired = 8  Have you had any previous evaluations, examinations, or treatment for this sleep problem or any other problems with your sleep? If so, please describe the evaluation, treatment, and results.: Had sleep study done resulting in CPAP  Have you used any medications (prescribed or otherwise) to help your sleep problem? If yes, include name, amount, frequency, and the prescribing physician.: No, don't want drugs  If employed, what time do you usually start and end work?: N/A  Do you ever change work shifts? If yes, describe how often (never, infrequently, regularly).: N/A  What time do you usually go to bed and wake up on: Weekdays? Weekends?: @11:30 PM and wake @ 8 AM  Do you have a regular bed partner?: Yes  How many minutes does it usually take to fall asleep at night after turning off the lights?: 10 minutes or so  What do you ordinarily do just prior to turning out the lights and attempting to go to sleep (e.g., reading, TV, baths, etc.)?: Read or watch TV  On average, how many times do you wake up during the night?: None, but once waken by dogs, hard going back to sleep  On average, how many times do you wake up to use the bathroom?: Once in the morning  Do you often wake up too early in the morning and are unable to return to sleep?: Many times, maybe 4 times a week  On average, how many hours of sleep do you get per night?: 7 to 8 hours  How do you usually awaken?  Alarm, spontaneously, or other?: Routinely  Is it difficult for you to awaken and get out of bed after sleeping? (Not at all, Sometimes, Very): Not at all  Do you nap or return to bed after  arising?: Yes, I return to bed for about 30 minutes then get up.  Are you bothered by sleepiness during the day?: Sometimes, when I don't sleep very well the might before.  Do you feel that you get too much sleep at night?: No  Do you feel that you get too little sleep at night?: Sometimes  Do you usually feel tired during the day? If so, what do you attribute this to?: No, but I do get tired after working around the house on projects.  Do you find yourself falling asleep when you don't mean to? : Yes  If yes, how long does your sleep episode last?: Maybe 30 to 45 minutes  Do you feel rested or refreshed after the sleep episode?: Yes  Have you ever suddenly fallen?: No  Have you ever experienced sudden body weakness?: No  Was the weakness brought on by any particular event or feeling? If so, briefly describe.: I haven't felt weak  Have you ever experienced weakness or paralysis upon going to sleep?: No  Have you ever experienced weakness or paralysis upon awakening from sleep?: No  If yes for either of the above two questions, please indicate how many times per week does this occur?: N/A  Have you ever experienced seeing things or hearing voices/noises: That weren't real? On going to sleep? During the night? On awakening from sleep? During the day?: No seeing or hearing things that weren't real.  Do you have difficulty breathing at night? If yes, briefly describe.: Sometimes, before I started using CPAP Machine.  How many times per week?: Once or twice a week  How did you become aware of this, at what age did this first occur, and how many years has this occurred?: 2000, it woke me up and I was choking, Just a couple of years.  Have you been told you snore while asleep? If so, does it disturb a bed partner (or someone in the same room), or someone in the next room?: Yes, by my wife  Have you ever experienced doing something without being aware of the action? If yes, please describe.: No  How many times per week does  "this occur?: N/A  Have you ever experienced upon lying in bed before sleep or on awakening from sleep: Restlessness of legs, \"nervous legs\", \"creeping crawling\" sensation of legs, or twitching of legs?: No  How many times per week does this occur, and how many minutes does the sensation last?: N/A  At what age did this first occur, and how many years has this occurred?: N/A  Have you ever been told that your arms or legs jerk or twitch while you are asleep? If yes, how many times per night does this occur?: No  Do you know, or have you ever been told that you do any of the following while sleeping: talk, walk, grit teeth, wet the bed, wake up screaming or seemingly afraid, have disturbing dreams, have unusual movements, wake up with headaches, (males) have erections? If yes to any of these, please indicate how many times per week, age started, last occurrence, treatment received.: No  Has anyone in your family been known to have any sleep problems? If yes, please list the type of problem (e.g., trouble getting to sleep, too sleepy, bed wetting, etc.), the relationship of this person to you, and the treatment received.: No    "

## 2019-04-25 NOTE — PROCEDURES
The patient underwent a split night polysomnogram with a CPAP titration using the standard montage for measurement of paramaters of sleep, respiratory events, movement abnormalities, heart rate and rhythm.  A Microphone was used to monitior snoring.  Interpretation:  Study start time was 09:43:46 PM.  Total recording time was 3h 7.0m (187 minutes) with a total sleep time of 2h 23.5m (143 minutes) resulting in a sleep efficiency of 76.74%.  Sleep latency from the start fo the study was 04 minutes minutes and REM latency from sleep onset was 00 minutes minutes.  Respiratory:   There were 87 apneas in total consisting of 1 obstructive apneas, 0 mixed apneas, and 86 central apneas.  There were 134 hypopneas in total.  The apnea index was 36.38 per hour and the hypopnea index was 56.03 per hour.  The overall AHI was 92.4, with a REM AHI of 0.00, and a supine AHI of 88.59.  Limb Movements:  There were a total of 0 periodic leg movements, of which 0 were PLMS arousals.  This resulted in a PLMS index of 0.0 and a PLMS arousal index of 0.0  Oximetry:  The mean SaO2 was 88.0% for the diagnostic portion of the study, with a minimum SaO2 of 79.0%.    Treatment:  Interpretation:  Treatment recording time was 5h 6.5m (306 minutes) with a total sleep time of 4h 39.0m (279 minutes) resulting in a sleep efficiency of 91.0%.    Sleep latency from the start of treatment was 08 minutes minutes and REM latency from sleep onset was 3h 2.0m minutes.    The patient had 104 arousals in total for an arousal index of 22.4.  Respiratory:   There were 24 apneas in total consisting of  1 obstructive apneas, 23 central apneas, and 0 mixed apneas for an apnea index of 5.16.    The patient had 53 hypopneas in total, which resulted in a hypopnea index of 11.40.    The overall AHI was 16.56, with a REM AHI of 1.09, and a supine AHI of 18.31.     Limb Movements:  There were a total of 8 periodic leg movements, of which 2 were PLMS arousals.  This  resulted in a PLMS index of 1.7 and a PLMS arousal index of 0.4.  Oximetry:  The mean SaO2 during treatment was 92.0%, with a minimum oxygen saturation of 84.0%.  CPAP was tried from 8cm H2O to 12cm H2O.  Bilevel was tried at 15/11cm H2O.      RECORDING TECHNIQUE:       After the scalp was prepared, gold plated electrodes were applied to the scalp according to the International 10-20 System. EEG (electroencephalogram) was continuously monitored from the O1-M2, O2-M1, C3-M2, C4-M1, F3-M2, and F4-M1.   EOGs (electrooculograms) were monitored by electrodes placed at the left and right outer canthi.  Chin EMG (electromyogram) was monitored by electrodes placed on the mentalis and sub-mentalis muscles.  Nasal and oral airflow were monitored using a triple port thermocouple as well as oronasal pressure transducer.  Respiratory effort was measured by inductive plethysmography technology employing abdominal and thoracic belts.  Blood oxygen saturation and pulse were monitored by pulse oximetry.  Heart rhythm was monitored by surface electrocardiogram.  Leg EMG was studied using surface electrodes placed on left and right anterior tibialis.  A microphone was used to monitor tracheal sounds and snoring.  Body position was monitored and documented by technician observation      SUMMARY:    This was an overnight diagnostic polysomnogram with a subsequent positive airway pressure titration, also known as a split- night study.  The patient chose to use a medium air fit F 30 mask with heated humidification.    During the diagnostic phase the total recording time was 187 minutes, the sleep period time was 181 minutes, and the total sleep time was 143 minutes.  The patient's sleep efficiency was 76.74 % which is reduced.  The patient experienced 0 REM period(s).    The sleep stage durations revealed 39.5 minutes of wake after sleep onset (WASO), 50 minutes of N1 sleep, 93 minutes of N2 sleep, 0 minutes of N3 sleep, and 0 minutes of  REM.    The latency to sleep was 4 minutes which is shortened.  The latency to REM was not applicable as the patient did not achieve REM.  Severe sleep fragmentation occurred.  The arousal index was 49.  The Total Limb Movement (isolated) Index was 14.2, the Limb Movement with Arousal Index was 8.8, and the PLM Series Index was 0.    The patient experienced 86 central and 1 obstructive apneas, 83 central and 51 obstructive hypopneas, 221 apneas and hypopneas, and 0 RERAS.  The apnea hypopnea index was 92.4, the RDI was 92.4, the mean event duration was 16.1 seconds, and the longest event lasted 44.3 seconds.  The REM index was 0 and the supine index was 79.  The apnea hypopnea index represents severe obstructive sleep apnea hypopnea syndrome.  Central events accounted for 38 9% of the respiratory events during the diagnostic phase.    The ela saturation during sleep was 80 % and the patient spent 77.2 % of the recording with saturations less than or equal to 90%.    During sleep, the minimum heart rate was 55 bpm, the mean heart rate was 67 bpm, and the maximum heart rate was 81 bpm.    Once the patient met the split-night protocol, the technician performed a positive airway pressure titration.  The technician initiated treatment with CPAP set at 8 cmH2O and increased the pressure to a maximum of 12 cmH2O. the apnea hypopnea index failed to normalize on any tested CPAP pressure so the technician to place patient on bilevel 15/11 cm H2O.    The patient did best on bilevel 15/11 cm H2O with a resultant AHI of 0.65, a minimum saturation of 87 %, and a mean saturation of 92 %.  The patient was able to achieve supine REM on bilevel.      ASSESSMENT:    Severe obstructive sleep apnea hypopnea - AHI 92.4  Significant and persistent nocturnal desaturation - ela saturation 80 % - saturations <90% or 77.2% of the diagnostic  Successful bilevel titration to a final pressure 15/11 cm H2O with a resultant AHI 0.65, ela  saturation of 87%, mean saturation of 92%, and the achievement of supine REM sleep        RECOMMENDATION:    Recommend bilevel 15/11 cmH2O using medium air fit F 30 mask and heated humidification followed by data card and clinical review in 6-8 weeks.

## 2019-04-26 ENCOUNTER — SLEEP CENTER VISIT (OUTPATIENT)
Dept: SLEEP MEDICINE | Facility: MEDICAL CENTER | Age: 69
End: 2019-04-26
Payer: MEDICARE

## 2019-04-26 VITALS
BODY MASS INDEX: 36.32 KG/M2 | SYSTOLIC BLOOD PRESSURE: 104 MMHG | OXYGEN SATURATION: 95 % | WEIGHT: 245.2 LBS | HEIGHT: 69 IN | HEART RATE: 64 BPM | DIASTOLIC BLOOD PRESSURE: 64 MMHG | RESPIRATION RATE: 16 BRPM

## 2019-04-26 DIAGNOSIS — I10 ESSENTIAL HYPERTENSION: ICD-10-CM

## 2019-04-26 DIAGNOSIS — I25.2 HISTORY OF MI (MYOCARDIAL INFARCTION): ICD-10-CM

## 2019-04-26 DIAGNOSIS — G47.33 OSA (OBSTRUCTIVE SLEEP APNEA): ICD-10-CM

## 2019-04-26 PROCEDURE — 99213 OFFICE O/P EST LOW 20 MIN: CPT | Performed by: INTERNAL MEDICINE

## 2019-04-26 NOTE — PROGRESS NOTES
Chief Complaint   Patient presents with   • Results     SS RE       HPI: This patient is a pleasant 68 y.o. Male who returns for sleep study results He was diagnosed with MANNY in [place], [place] 16 years ago and prescribed CPAP: 12 cm of water.  He has been using his original CPAP machine which he feels works well, however needs new supplies.  In terms of sleep habits, he goes to bed around 11:30 PM, gets to sleep within 10 minutes and generally sleeps through the night. Occasionally he wakes up in the early morning and cannot resume sleep.  He gets up by 8 AM, feeling rested.  He denies any significant daytime hypersomnolence.  His BMI is 36.  Polysomnography in the sleep laboratory showed very severe MANNY with AHI 92/h associated with oxygen desaturations to 80%.  He was successfully titrated on BiPAP: 15/11 cm of water with resultant AHI of 0.6 and normal oximetry.    Past Medical History:   Diagnosis Date   • Apnea, sleep    • Chickenpox    • Daytime sleepiness    • Elevated hemoglobin A1c    • Frequent urination    • Gasping for breath    • Heart attack (HCC)    • Hyperlipidemia    • Hypertension    • Influenza    • Mumps    • Obesity    • Ringing in ears    • Sleep apnea    • Snoring    • Wears glasses    • Weight loss        Social History     Social History   • Marital status:      Spouse name: N/A   • Number of children: N/A   • Years of education: N/A     Occupational History   • Not on file.     Social History Main Topics   • Smoking status: Former Smoker     Years: 40.00     Types: Cigarettes     Quit date: 3/9/2016   • Smokeless tobacco: Never Used   • Alcohol use Yes      Comment: rare   • Drug use: No   • Sexual activity: Yes     Partners: Female     Other Topics Concern   • Not on file     Social History Narrative   • No narrative on file       Family History   Problem Relation Age of Onset   • Cancer Mother    • Lung Disease Mother    • Psychiatry Father        Current Outpatient  "Prescriptions on File Prior to Visit   Medication Sig Dispense Refill   • zolpidem (AMBIEN) 5 MG Tab Take 1 Tab by mouth at bedtime as needed for Sleep (1 to 3 po qhs prn insomnia/sleep study. Bring to sleep study.) for up to 2 days. 3 Tab 0   • lisinopril-hydrochlorothiazide (PRINZIDE, ZESTORETIC) 20-12.5 MG per tablet TAKE ONE TABLET BY MOUTH EVERY DAY 90 Tab 0   • clopidogrel (PLAVIX) 75 MG Tab TAKE ONE TABLET BY MOUTH EVERY DAY 90 Tab 1   • aspirin EC (ECOTRIN) 81 MG Tablet Delayed Response Take 81 mg by mouth every day.     • B Complex Vitamins (B COMPLEX PO) Take  by mouth.     • Omega-3 Fatty Acids (FISH OIL) 1000 MG Cap capsule Take 1,000 mg by mouth 2 Times a Day.       No current facility-administered medications on file prior to visit.        Allergies: Patient has no known allergies.    ROS:   Constitutional: Denies fevers, chills, night sweats, fatigue or weight loss  Eyes: Denies vision loss, pain, drainage, double vision  Ears, Nose, Throat: Denies earache, difficulty hearing, tinnitus, nasal congestion, hoarseness  Cardiovascular: Denies chest pain, tightness, palpitations, orthopnea or edema  Respiratory: Denies shortness of breath, cough, wheezing, hemoptysis  Sleep: As in HPI  GI: Denies heartburn, dysphagia, nausea, abdominal pain, diarrhea or constipation  : Denies frequent urination, hematuria, discharge or painful urination  Musculoskeletal: Denies back pain, painful joints, sore muscles  Neurological: Denies weakness or headaches  Skin: No rashes    /64 (BP Location: Left arm, Patient Position: Sitting, BP Cuff Size: Adult)   Pulse 64   Resp 16   Ht 1.753 m (5' 9\")   Wt 111.2 kg (245 lb 3.2 oz)   SpO2 95%     Physical Exam:  Appearance: Well-nourished, well-developed, in no acute distress  HEENT: Normocephalic, atraumatic, white sclera, equals equal, Mallampati 3  Neck: No masses  Respiratory: no intercostal retractions or accessory muscle use  Lungs auscultation: Verbal " wheezing  Cardiovascular: No LE edema  Abdomen: Nondistended  Gait: Normal  Digits: No clubbing, cyanosis  Motor: No focal deficits  Orientation: Oriented to time, person and place    Diagnosis:  1. MANNY (obstructive sleep apnea)  DME BiPAP   2. BMI 36.0-36.9,adult  OBESITY COUNSELING (No Charge): Patient identified as having weight management issue.  Appropriate orders and counseling given.   3. Essential hypertension     4. History of MI (myocardial infarction)         Plan:  The patient has very severe MANNY warranting BiPAP therapy.  He will be set up on BiPAP: 15/11 cm of water using a medium AirFit F30 mask with heated humidification.  We will download compliance data within 3 months on BiPAP.  Patient's body mass index is 36.21 kg/m². Exercise and nutrition counseling were performed at this visit.  Return in about 3 months (around 7/26/2019).

## 2019-05-26 ENCOUNTER — PATIENT MESSAGE (OUTPATIENT)
Dept: MEDICAL GROUP | Facility: PHYSICIAN GROUP | Age: 69
End: 2019-05-26

## 2019-05-28 RX ORDER — CLOPIDOGREL BISULFATE 75 MG/1
TABLET ORAL
Qty: 90 TAB | Refills: 3 | Status: SHIPPED | OUTPATIENT
Start: 2019-05-28 | End: 2020-06-24

## 2019-05-28 RX ORDER — LISINOPRIL AND HYDROCHLOROTHIAZIDE 20; 12.5 MG/1; MG/1
TABLET ORAL
Qty: 90 TAB | Refills: 3 | Status: SHIPPED | OUTPATIENT
Start: 2019-05-28 | End: 2020-12-28

## 2019-05-28 NOTE — TELEPHONE ENCOUNTER
Requested Prescriptions     Signed Prescriptions Disp Refills   • clopidogrel (PLAVIX) 75 MG Tab 90 Tab 3     Sig: TAKE ONE TABLET BY MOUTH EVERY DAY     Authorizing Provider: SALLIE SETH   • lisinopril-hydrochlorothiazide (PRINZIDE, ZESTORETIC) 20-12.5 MG per tablet 90 Tab 3     Sig: TAKE ONE TABLET BY MOUTH EVERY DAY     Authorizing Provider: SALLIE SETH A.P.R.N.

## 2019-07-26 ENCOUNTER — SLEEP CENTER VISIT (OUTPATIENT)
Dept: SLEEP MEDICINE | Facility: MEDICAL CENTER | Age: 69
End: 2019-07-26
Payer: MEDICARE

## 2019-07-26 VITALS
WEIGHT: 245 LBS | BODY MASS INDEX: 36.29 KG/M2 | SYSTOLIC BLOOD PRESSURE: 128 MMHG | RESPIRATION RATE: 16 BRPM | HEIGHT: 69 IN | OXYGEN SATURATION: 92 % | DIASTOLIC BLOOD PRESSURE: 68 MMHG | HEART RATE: 74 BPM

## 2019-07-26 DIAGNOSIS — G47.33 OSA ON CPAP: ICD-10-CM

## 2019-07-26 PROCEDURE — 99213 OFFICE O/P EST LOW 20 MIN: CPT | Performed by: INTERNAL MEDICINE

## 2019-07-26 NOTE — PROGRESS NOTES
Chief Complaint   Patient presents with   • Follow-Up     MANNY     HPI: This patient is a pleasant 68 y.o. Male who returns for MANNY and first CPAP compliance check.  He was diagnosed with MANNY in [place], [place] 16 years ago and prescribed CPAP: 12 cm of water.  His original sleep study results were unavailable.  Updated polysomnogram showed very severe MANNY with AHI 92/h and he was titrated on BiPAP therapy.  He found BiPAP pressures excessive at 15/11 cm of water with significant mask leakage and lowered his BiPAP pressures to 13/9 cm of water subsequently.  He feels he is sleeping well at current pressure settings and awakening feeling rested.  Compliance card confirms 100% BiPAP usage for average 7.2 hours nightly.  His average AHI is 2.6 .  Weight is been stable with BMI:36.    Past Medical History:   Diagnosis Date   • Apnea, sleep    • Chickenpox    • Daytime sleepiness    • Elevated hemoglobin A1c    • Frequent urination    • Gasping for breath    • Heart attack (HCC)    • Hyperlipidemia    • Hypertension    • Influenza    • Mumps    • Obesity    • Ringing in ears    • Sleep apnea    • Snoring    • Wears glasses    • Weight loss        Social History     Social History   • Marital status:      Spouse name: N/A   • Number of children: N/A   • Years of education: N/A     Occupational History   • Not on file.     Social History Main Topics   • Smoking status: Former Smoker     Years: 40.00     Types: Cigarettes     Quit date: 3/9/2016   • Smokeless tobacco: Never Used   • Alcohol use Yes      Comment: rare   • Drug use: No   • Sexual activity: Yes     Partners: Female     Other Topics Concern   • Not on file     Social History Narrative   • No narrative on file       Family History   Problem Relation Age of Onset   • Cancer Mother    • Lung Disease Mother    • Psychiatry Father        Current Outpatient Prescriptions on File Prior to Visit   Medication Sig Dispense Refill   • clopidogrel (PLAVIX) 75 MG  "Tab TAKE ONE TABLET BY MOUTH EVERY DAY 90 Tab 3   • lisinopril-hydrochlorothiazide (PRINZIDE, ZESTORETIC) 20-12.5 MG per tablet TAKE ONE TABLET BY MOUTH EVERY DAY 90 Tab 3   • aspirin EC (ECOTRIN) 81 MG Tablet Delayed Response Take 81 mg by mouth every day.     • B Complex Vitamins (B COMPLEX PO) Take  by mouth.     • Omega-3 Fatty Acids (FISH OIL) 1000 MG Cap capsule Take 1,000 mg by mouth 2 Times a Day.       No current facility-administered medications on file prior to visit.        Allergies: Patient has no known allergies.    ROS:   Constitutional: Denies fevers, chills, night sweats, fatigue or weight loss  Eyes: Denies vision loss, pain, drainage, double vision  Ears, Nose, Throat: Denies earache, difficulty hearing, tinnitus, nasal congestion, hoarseness  Cardiovascular: Denies chest pain, tightness, palpitations, orthopnea or edema  Respiratory: Denies shortness of breath, cough, wheezing, hemoptysis  Sleep: Denies daytime sleepiness, snoring, apneas, insomnia, morning headaches  GI: Denies heartburn, dysphagia, nausea, abdominal pain, diarrhea or constipation  : Denies frequent urination, hematuria, discharge or painful urination  Musculoskeletal: Denies back pain, painful joints, sore muscles  Neurological: Denies weakness or headaches  Skin: No rashes    /68 (BP Location: Left arm, Patient Position: Sitting, BP Cuff Size: Adult)   Pulse 74   Resp 16   Ht 1.753 m (5' 9\")   Wt 111.1 kg (245 lb)   SpO2 92%     Physical Exam:  Appearance: Well-nourished, well-developed, in no acute distress  HEENT: Normocephalic, atraumatic, white sclera, PERRLA, Mallampati 3  Neck: No adenopathy or masses  Respiratory: no intercostal retractions or accessory muscle use  Lungs auscultation: Clear to auscultation bilaterally  Cardiovascular: Regular rate rhythm. No murmurs, rubs or gallops.  No LE edema  Abdomen: soft, nondistended  Gait: Normal  Digits: No clubbing, cyanosis  Motor: No focal " deficits  Orientation: Oriented to time, person and place    Diagnosis:  1. MANNY on CPAP     2. BMI 36.0-36.9,adult  OBESITY COUNSELING (No Charge): Patient identified as having weight management issue.  Appropriate orders and counseling given.       Plan:  The patient shows excellent compliance and response to BiPAP therapy.  His AHI is normal.    Continue BiPAP: 13/9 cm of water.    Replace CPAP mask every 3 months.  Patient's body mass index is 36.18 kg/m². Exercise and nutrition counseling were performed at this visit.  Return in about 6 months (around 1/26/2020).

## 2019-12-23 ENCOUNTER — OFFICE VISIT (OUTPATIENT)
Dept: URGENT CARE | Facility: CLINIC | Age: 69
End: 2019-12-23
Payer: MEDICARE

## 2019-12-23 VITALS
HEART RATE: 82 BPM | OXYGEN SATURATION: 97 % | BODY MASS INDEX: 37.13 KG/M2 | RESPIRATION RATE: 14 BRPM | TEMPERATURE: 98.7 F | HEIGHT: 68 IN | SYSTOLIC BLOOD PRESSURE: 128 MMHG | WEIGHT: 245 LBS | DIASTOLIC BLOOD PRESSURE: 76 MMHG

## 2019-12-23 DIAGNOSIS — S01.312A: Primary | ICD-10-CM

## 2019-12-23 PROCEDURE — 12013 RPR F/E/E/N/L/M 2.6-5.0 CM: CPT | Performed by: PHYSICIAN ASSISTANT

## 2019-12-23 RX ORDER — CEPHALEXIN 500 MG/1
500 CAPSULE ORAL 4 TIMES DAILY
Qty: 28 CAP | Refills: 0 | Status: SHIPPED | OUTPATIENT
Start: 2019-12-23 | End: 2019-12-30

## 2019-12-24 NOTE — PATIENT INSTRUCTIONS
Laceration Care, Adult  A laceration is a cut that goes through all layers of the skin. The cut also goes into the tissue that is right under the skin. Some cuts heal on their own. Others need to be closed with stitches (sutures), staples, skin adhesive strips, or wound glue. Taking care of your cut lowers your risk of infection and helps your cut to heal better.  HOW TO TAKE CARE OF YOUR CUT  For stitches or staples:  · Keep the wound clean and dry.  · If you were given a bandage (dressing), you should change it at least one time per day or as told by your doctor. You should also change it if it gets wet or dirty.  · Keep the wound completely dry for the first 24 hours or as told by your doctor. After that time, you may take a shower or a bath. However, make sure that the wound is not soaked in water until after the stitches or staples have been removed.  · Clean the wound one time each day or as told by your doctor:  ¨ Wash the wound with soap and water.  ¨ Rinse the wound with water until all of the soap comes off.  ¨ Pat the wound dry with a clean towel. Do not rub the wound.  · After you clean the wound, put a thin layer of antibiotic ointment on it as told by your doctor. This ointment:  ¨ Helps to prevent infection.  ¨ Keeps the bandage from sticking to the wound.  · Have your stitches or staples removed as told by your doctor.  If your doctor used skin adhesive strips:   · Keep the wound clean and dry.  · If you were given a bandage, you should change it at least one time per day or as told by your doctor. You should also change it if it gets dirty or wet.  · Do not get the skin adhesive strips wet. You can take a shower or a bath, but be careful to keep the wound dry.  · If the wound gets wet, pat it dry with a clean towel. Do not rub the wound.  · Skin adhesive strips fall off on their own. You can trim the strips as the wound heals. Do not remove any strips that are still stuck to the wound. They will  fall off after a while.  If your doctor used wound glue:  · Try to keep your wound dry, but you may briefly wet it in the shower or bath. Do not soak the wound in water, such as by swimming.  · After you take a shower or a bath, gently pat the wound dry with a clean towel. Do not rub the wound.  · Do not do any activities that will make you really sweaty until the skin glue has fallen off on its own.  · Do not apply liquid, cream, or ointment medicine to your wound while the skin glue is still on.  · If you were given a bandage, you should change it at least one time per day or as told by your doctor. You should also change it if it gets dirty or wet.  · If a bandage is placed over the wound, do not let the tape for the bandage touch the skin glue.  · Do not pick at the glue. The skin glue usually stays on for 5-10 days. Then, it falls off of the skin.  General Instructions   · To help prevent scarring, make sure to cover your wound with sunscreen whenever you are outside after stitches are removed, after adhesive strips are removed, or when wound glue stays in place and the wound is healed. Make sure to wear a sunscreen of at least 30 SPF.  · Take over-the-counter and prescription medicines only as told by your doctor.  · If you were given antibiotic medicine or ointment, take or apply it as told by your doctor. Do not stop using the antibiotic even if your wound is getting better.  · Do not scratch or pick at the wound.  · Keep all follow-up visits as told by your doctor. This is important.  · Check your wound every day for signs of infection. Watch for:  ¨ Redness, swelling, or pain.  ¨ Fluid, blood, or pus.  · Raise (elevate) the injured area above the level of your heart while you are sitting or lying down, if possible.  GET HELP IF:  · You got a tetanus shot and you have any of these problems at the injection site:  ¨ Swelling.  ¨ Very bad pain.  ¨ Redness.  ¨ Bleeding.  · You have a fever.  · A wound that was  closed breaks open.  · You notice a bad smell coming from your wound or your bandage.  · You notice something coming out of the wound, such as wood or glass.  · Medicine does not help your pain.  · You have more redness, swelling, or pain at the site of your wound.  · You have fluid, blood, or pus coming from your wound.  · You notice a change in the color of your skin near your wound.  · You need to change the bandage often because fluid, blood, or pus is coming from the wound.  · You start to have a new rash.  · You start to have numbness around the wound.  GET HELP RIGHT AWAY IF:  · You have very bad swelling around the wound.  · Your pain suddenly gets worse and is very bad.  · You notice painful lumps near the wound or on skin that is anywhere on your body.  · You have a red streak going away from your wound.  · The wound is on your hand or foot and you cannot move a finger or toe like you usually can.  · The wound is on your hand or foot and you notice that your fingers or toes look pale or bluish.     This information is not intended to replace advice given to you by your health care provider. Make sure you discuss any questions you have with your health care provider.     Document Released: 06/05/2009 Document Revised: 05/03/2016 Document Reviewed: 12/14/2015  [employer] Interactive Patient Education ©2016 [employer]

## 2019-12-24 NOTE — PROGRESS NOTES
Subjective:      Pt is a 69 y.o. male who presents with Laceration (left ear pain)            HPI  This is a new problem. PT was building a fence at home and using large clamps and came around the corner and lacerated his left ear on a large clamp causing bleeding about 1 hour ago. Pt notes tetanus was UTD from one year ago. Pt has not taken any Rx medications for this condition. Pt states the pain is a 3/10, aching in nature and worse at the time of injury. Pt denies CP, SOB, NVD, paresthesias, headaches, dizziness, change in vision, hives, or other joint pain. The pt's medication list, problem list, and allergies have been evaluated and reviewed during today's visit.    PMH:  Past Medical History:   Diagnosis Date   • Apnea, sleep    • Chickenpox    • Daytime sleepiness    • Elevated hemoglobin A1c    • Frequent urination    • Gasping for breath    • Heart attack (HCC)    • Hyperlipidemia    • Hypertension    • Influenza    • Mumps    • Obesity    • Ringing in ears    • Sleep apnea    • Snoring    • Wears glasses    • Weight loss        PSH:  Past Surgical History:   Procedure Laterality Date   • APPENDECTOMY         Fam Hx:    family history includes Cancer in his mother; Lung Disease in his mother; Psychiatric Illness in his father.  Family Status   Relation Name Status   • Mo     • Fa         Soc HX:  Social History     Socioeconomic History   • Marital status:      Spouse name: Not on file   • Number of children: Not on file   • Years of education: Not on file   • Highest education level: Not on file   Occupational History   • Not on file   Social Needs   • Financial resource strain: Not on file   • Food insecurity:     Worry: Not on file     Inability: Not on file   • Transportation needs:     Medical: Not on file     Non-medical: Not on file   Tobacco Use   • Smoking status: Former Smoker     Packs/day: 0.00     Years: 40.00     Pack years: 0.00     Types: Cigarettes     Last attempt  to quit: 3/9/2016     Years since quitting: 3.7   • Smokeless tobacco: Never Used   Substance and Sexual Activity   • Alcohol use: Yes     Comment: rare   • Drug use: No   • Sexual activity: Yes     Partners: Female   Lifestyle   • Physical activity:     Days per week: Not on file     Minutes per session: Not on file   • Stress: Not on file   Relationships   • Social connections:     Talks on phone: Not on file     Gets together: Not on file     Attends [employer] service: Not on file     Active member of club or organization: Not on file     Attends meetings of clubs or organizations: Not on file     Relationship status: Not on file   • Intimate partner violence:     Fear of current or ex partner: Not on file     Emotionally abused: Not on file     Physically abused: Not on file     Forced sexual activity: Not on file   Other Topics Concern   • Not on file   Social History Narrative   • Not on file         Medications:    Current Outpatient Medications:   •  cephALEXin (KEFLEX) 500 MG Cap, Take 1 Cap by mouth 4 times a day for 7 days., Disp: 28 Cap, Rfl: 0  •  clopidogrel (PLAVIX) 75 MG Tab, TAKE ONE TABLET BY MOUTH EVERY DAY, Disp: 90 Tab, Rfl: 3  •  lisinopril-hydrochlorothiazide (PRINZIDE, ZESTORETIC) 20-12.5 MG per tablet, TAKE ONE TABLET BY MOUTH EVERY DAY, Disp: 90 Tab, Rfl: 3  •  aspirin EC (ECOTRIN) 81 MG Tablet Delayed Response, Take 81 mg by mouth every day., Disp: , Rfl:   •  B Complex Vitamins (B COMPLEX PO), Take  by mouth., Disp: , Rfl:   •  Omega-3 Fatty Acids (FISH OIL) 1000 MG Cap capsule, Take 1,000 mg by mouth 2 Times a Day., Disp: , Rfl:       Allergies:  Patient has no known allergies.    ROS    Constitutional: Negative for fever, chills and malaise/fatigue.   HENT: Negative for congestion and sore throat.    Eyes: Negative for blurred vision, double vision and photophobia.   Respiratory: Negative for cough and shortness of breath.  Cardiovascular: Negative for chest pain and palpitations.  "  Gastrointestinal: Negative for heartburn, nausea, vomiting, abdominal pain, diarrhea and constipation.   Genitourinary: Negative for dysuria and flank pain.   Musculoskeletal: Negative for joint pain and myalgias.   Skin:+left ear laceration  Neurological: Negative for dizziness, tingling and headaches.   Endo/Heme/Allergies: Does not bruise/bleed easily.   Psychiatric/Behavioral: Negative for depression. The patient is not nervous/anxious.         Objective:     /76   Pulse 82   Temp 37.1 °C (98.7 °F) (Temporal)   Resp 14   Ht 1.727 m (5' 8\")   Wt 111.1 kg (245 lb)   SpO2 97%   BMI 37.25 kg/m²      Physical Exam  HENT:      Right Ear: Hearing, tympanic membrane, ear canal and external ear normal. No decreased hearing noted. No laceration, drainage, swelling or tenderness. No middle ear effusion. There is no impacted cerumen. No foreign body. No mastoid tenderness. No PE tube. No hemotympanum. Tympanic membrane is not injected, scarred, perforated, erythematous, retracted or bulging. Tympanic membrane has normal mobility.      Left Ear: Hearing, tympanic membrane, ear canal and external ear normal. No decreased hearing noted. Laceration, swelling and tenderness present. No drainage.  No middle ear effusion. There is no impacted cerumen. No foreign body. No mastoid tenderness. No PE tube. No hemotympanum. Tympanic membrane is not injected, scarred, perforated, erythematous, retracted or bulging. Tympanic membrane has normal mobility.      Ears:             Constitutional: PT is oriented to person, place, and time. PT appears well-developed and well-nourished. No distress.   HENT:   Head: Normocephalic and atraumatic.   Mouth/Throat: Oropharynx is clear and moist. No oropharyngeal exudate.   Eyes: Conjunctivae normal and EOM are normal. Pupils are equal, round, and reactive to light.   Neck: Normal range of motion. Neck supple. No thyromegaly present.   Cardiovascular: Normal rate, regular rhythm, " normal heart sounds and intact distal pulses.  Exam reveals no gallop and no friction rub.    No murmur heard.  Pulmonary/Chest: Effort normal and breath sounds normal. No respiratory distress. PT has no wheezes. PT has no rales. Pt exhibits no tenderness.   Abdominal: Soft. Bowel sounds are normal. PT exhibits no distension and no mass. There is no tenderness. There is no rebound and no guarding.   Musculoskeletal: Normal range of motion. PT exhibits no edema and no tenderness.   Neurological: PT is alert and oriented to person, place, and time. PT has normal reflexes. No cranial nerve deficit.   Skin: Skin is warm and dry. No rash noted. PT is not diaphoretic. No erythema. SEE EAR      Psychiatric: PT has a normal mood and affect. PT behavior is normal. Judgment and thought content normal.          Assessment/Plan:       1. Laceration of external ear, left, initial encounter    - cephALEXin (KEFLEX) 500 MG Cap; Take 1 Cap by mouth 4 times a day for 7 days.  Dispense: 28 Cap; Refill: 0      Laceration repair  Rest, fluids encouraged.  OTC tylenol or ibuprofen for pain  AVS with medical info given.  Pt was in full understanding and agreement with the plan.  Differential diagnosis, natural history, supportive care, and indications for immediate follow-up discussed. All questions answered. Patient agrees with the plan of care.  Follow-up in 5 days for suture removal or as needed if symptoms worsen or fail to improve to PCP, Urgent care or Emergency Room.

## 2019-12-24 NOTE — PROCEDURES
Laceration Repair  Date/Time: 12/23/2019 5:12 PM  Performed by: Reagan Chairez P.A.-C.  Authorized by: Reagan Chairez P.A.-C.   Body area: head/neck  Location details: left ear  Laceration length: 2.7 cm  Foreign bodies: no foreign bodies  Tendon involvement: none  Nerve involvement: none  Vascular damage: no  Anesthesia: local infiltration    Anesthesia:  Local Anesthetic: lidocaine 1% without epinephrine  Anesthetic total: 2 mL    Sedation:  Patient sedated: no    Preparation: Patient was prepped and draped in the usual sterile fashion.  Irrigation solution: saline  Irrigation method: syringe  Amount of cleaning: standard  Debridement: none  Degree of undermining: none  Skin closure: 4-0 nylon  Number of sutures: 4  Technique: simple  Approximation: close  Approximation difficulty: simple  Dressing: 4x4 sterile gauze, pressure dressing and antibiotic ointment  Patient tolerance: Patient tolerated the procedure well with no immediate complications

## 2019-12-30 ENCOUNTER — OFFICE VISIT (OUTPATIENT)
Dept: MEDICAL GROUP | Facility: PHYSICIAN GROUP | Age: 69
End: 2019-12-30
Payer: MEDICARE

## 2019-12-30 VITALS
SYSTOLIC BLOOD PRESSURE: 136 MMHG | HEIGHT: 68 IN | OXYGEN SATURATION: 95 % | BODY MASS INDEX: 39.71 KG/M2 | TEMPERATURE: 98.1 F | RESPIRATION RATE: 14 BRPM | WEIGHT: 262 LBS | DIASTOLIC BLOOD PRESSURE: 70 MMHG | HEART RATE: 65 BPM

## 2019-12-30 DIAGNOSIS — Z23 NEED FOR VACCINATION: ICD-10-CM

## 2019-12-30 DIAGNOSIS — Z48.02 VISIT FOR SUTURE REMOVAL: ICD-10-CM

## 2019-12-30 PROCEDURE — 99212 OFFICE O/P EST SF 10 MIN: CPT | Mod: 25 | Performed by: NURSE PRACTITIONER

## 2019-12-30 PROCEDURE — 90472 IMMUNIZATION ADMIN EACH ADD: CPT | Performed by: NURSE PRACTITIONER

## 2019-12-30 PROCEDURE — 90662 IIV NO PRSV INCREASED AG IM: CPT | Performed by: NURSE PRACTITIONER

## 2019-12-30 PROCEDURE — 90715 TDAP VACCINE 7 YRS/> IM: CPT | Performed by: NURSE PRACTITIONER

## 2019-12-30 PROCEDURE — G0008 ADMIN INFLUENZA VIRUS VAC: HCPCS | Performed by: NURSE PRACTITIONER

## 2019-12-30 NOTE — PROGRESS NOTES
"Chief Complaint   Patient presents with   • Suture / Staple Removal     left ear        Subjective:   Choco Chavarria is a 69 y.o. male here today for evaluation and management of:    Visit for suture removal  Patient had left ear laceration with 4 sutures placed.  Here today for suture removal.  No exudate noted. Healed 4 sutures.  Also requesting TDAP and flu update today    Need for vaccination  Recent left ear laceration.  Requesting TDAP update and flu vaccine.  No acute illness            Current medicines (including changes today)  Current Outpatient Medications   Medication Sig Dispense Refill   • cephALEXin (KEFLEX) 500 MG Cap Take 1 Cap by mouth 4 times a day for 7 days. 28 Cap 0   • clopidogrel (PLAVIX) 75 MG Tab TAKE ONE TABLET BY MOUTH EVERY DAY 90 Tab 3   • lisinopril-hydrochlorothiazide (PRINZIDE, ZESTORETIC) 20-12.5 MG per tablet TAKE ONE TABLET BY MOUTH EVERY DAY 90 Tab 3   • aspirin EC (ECOTRIN) 81 MG Tablet Delayed Response Take 81 mg by mouth every day.     • B Complex Vitamins (B COMPLEX PO) Take  by mouth.     • Omega-3 Fatty Acids (FISH OIL) 1000 MG Cap capsule Take 1,000 mg by mouth 2 Times a Day.       No current facility-administered medications for this visit.      He  has a past medical history of Apnea, sleep, Chickenpox, Daytime sleepiness, Elevated hemoglobin A1c, Frequent urination, Gasping for breath, Heart attack (HCC), Hyperlipidemia, Hypertension, Influenza, Mumps, Obesity, Ringing in ears, Sleep apnea, Snoring, Wears glasses, and Weight loss.    ROS as stated in hpi  No chest pain, no shortness of breath, no abdominal pain       Objective:     /70 (BP Location: Left arm, Patient Position: Sitting)   Pulse 65   Temp 36.7 °C (98.1 °F) (Temporal)   Resp 14   Ht 1.727 m (5' 8\")   Wt 118.8 kg (262 lb)   SpO2 95%  Body mass index is 39.84 kg/m².   Physical Exam:  Constitutional: Alert, no distress.  Skin: Warm, dry, good turgor,no cyanosis, no rashes in visible areas.  Eye: " Equal, round and reactive, conjunctiva clear, lids normal.  Ears: No tenderness, no discharge.  External canals are without any significant edema or erythema. 4 healed sutures in left ear.  Gross auditory acuity is intact.  Nose: symmetrical without tenderness, no discharge.  Mouth/Throat: lips without lesion.  Oropharynx clear.    Neck: Trachea midline, no masses, no obvious thyroid enlargement.. . Range of motion within normal limits.  Neuro: Cranial nerves 2-12 grossly intact.  No sensory deficit.  Respiratory: Unlabored respiratory effort,   Psych: Alert and oriented x3, normal affect and mood and judgement.        Assessment and Plan:   The following treatment plan was discussed    1. Need for vaccination  Recent injury/laceration to left ear.  Due for TDAP booster.    I have placed the below orders and discussed them with an approved delegating provider.  The MA is performing the below orders under the direction of Dr. Darian MD.    - Tdap Vaccine =>8YO IM    2. Visit for suture removal  New problem.  Left ear laveration with 4 sutures.  Here for removal.  The ear was cleaned and debrided of old blood.  4 sutures were removed without difficulty.  Neosporin applied.  Patient tolerated well.  Discharge instructions reviewed.       Followup: Return if symptoms worsen or fail to improve.         Educated in proper administration of medication(s) ordered today including safety, possible SE, risks, benefits, rationale and alternatives to therapy.     Please note that this dictation was created using voice recognition software. I have made every reasonable attempt to correct obvious errors, but I expect that there are errors of grammar and possibly content that I did not discover before finalizing the note.

## 2019-12-30 NOTE — ASSESSMENT & PLAN NOTE
Patient had left ear laceration with 4 sutures placed.  Here today for suture removal.  No exudate noted. Healed 4 sutures.  Also requesting TDAP and flu update today

## 2020-01-29 ENCOUNTER — APPOINTMENT (OUTPATIENT)
Dept: SLEEP MEDICINE | Facility: MEDICAL CENTER | Age: 70
End: 2020-01-29
Payer: MEDICARE

## 2020-06-24 RX ORDER — CLOPIDOGREL BISULFATE 75 MG/1
TABLET ORAL
Qty: 90 TAB | Refills: 0 | Status: SHIPPED | OUTPATIENT
Start: 2020-06-24 | End: 2020-09-12 | Stop reason: SDUPTHER

## 2020-06-24 NOTE — TELEPHONE ENCOUNTER
Requested Prescriptions     Signed Prescriptions Disp Refills   • clopidogrel (PLAVIX) 75 MG Tab 90 Tab 0     Sig: TAKE ONE TABLET BY MOUTH EVERY DAY     Authorizing Provider: SALLIE SETH A.P.R.N.

## 2020-09-14 RX ORDER — CLOPIDOGREL BISULFATE 75 MG/1
75 TABLET ORAL
Qty: 90 TAB | Refills: 2 | Status: SHIPPED | OUTPATIENT
Start: 2020-09-14 | End: 2021-05-23 | Stop reason: SDUPTHER

## 2020-09-14 NOTE — TELEPHONE ENCOUNTER
Requested Prescriptions     Signed Prescriptions Disp Refills   • clopidogrel (PLAVIX) 75 MG Tab 90 Tab 2     Sig: Take 1 Tab by mouth every day.     Authorizing Provider: SALLIE SETH A.P.R.N.

## 2020-09-14 NOTE — TELEPHONE ENCOUNTER
Received request via: Patient    Was the patient seen in the last year in this department? Yes    Does the patient have an active prescription (recently filled or refills available) for medication(s) requested? DUE 9/24/20

## 2020-11-08 ENCOUNTER — PATIENT MESSAGE (OUTPATIENT)
Dept: MEDICAL GROUP | Facility: PHYSICIAN GROUP | Age: 70
End: 2020-11-08

## 2020-12-28 RX ORDER — LISINOPRIL AND HYDROCHLOROTHIAZIDE 20; 12.5 MG/1; MG/1
TABLET ORAL
Qty: 30 TAB | Refills: 0 | Status: SHIPPED | OUTPATIENT
Start: 2020-12-28 | End: 2021-03-31 | Stop reason: SDUPTHER

## 2020-12-28 NOTE — TELEPHONE ENCOUNTER
Requested Prescriptions     Signed Prescriptions Disp Refills   • lisinopril-hydrochlorothiazide (PRINZIDE) 20-12.5 MG per tablet 30 Tab 0     Sig: TAKE ONE TABLET BY MOUTH EVERY DAY     Authorizing Provider: SALLIE SETH A.P.R.N.

## 2020-12-28 NOTE — TELEPHONE ENCOUNTER
Received request via: Pharmacy    Was the patient seen in the last year in this department? Yes 12/30/2019    Does the patient have an active prescription (recently filled or refills available) for medication(s) requested? No

## 2021-01-05 ENCOUNTER — OFFICE VISIT (OUTPATIENT)
Dept: MEDICAL GROUP | Facility: PHYSICIAN GROUP | Age: 71
End: 2021-01-05
Payer: MEDICARE

## 2021-01-05 VITALS
BODY MASS INDEX: 35.46 KG/M2 | TEMPERATURE: 98 F | HEART RATE: 60 BPM | WEIGHT: 234 LBS | RESPIRATION RATE: 18 BRPM | SYSTOLIC BLOOD PRESSURE: 130 MMHG | OXYGEN SATURATION: 95 % | DIASTOLIC BLOOD PRESSURE: 66 MMHG | HEIGHT: 68 IN

## 2021-01-05 DIAGNOSIS — I10 ESSENTIAL HYPERTENSION: ICD-10-CM

## 2021-01-05 DIAGNOSIS — E55.9 VITAMIN D DEFICIENCY: ICD-10-CM

## 2021-01-05 DIAGNOSIS — I25.2 HISTORY OF MI (MYOCARDIAL INFARCTION): ICD-10-CM

## 2021-01-05 DIAGNOSIS — E78.5 DYSLIPIDEMIA: ICD-10-CM

## 2021-01-05 DIAGNOSIS — R73.03 PRE-DIABETES: ICD-10-CM

## 2021-01-05 DIAGNOSIS — Z11.59 ENCOUNTER FOR HEPATITIS C SCREENING TEST FOR LOW RISK PATIENT: ICD-10-CM

## 2021-01-05 DIAGNOSIS — G62.9 NEUROPATHY: ICD-10-CM

## 2021-01-05 PROBLEM — Z23 NEED FOR VACCINATION: Status: RESOLVED | Noted: 2019-12-30 | Resolved: 2021-01-05

## 2021-01-05 PROBLEM — Z48.02 ENCOUNTER FOR REMOVAL OF SUTURES: Status: RESOLVED | Noted: 2019-12-30 | Resolved: 2021-01-05

## 2021-01-05 PROCEDURE — 99214 OFFICE O/P EST MOD 30 MIN: CPT | Performed by: NURSE PRACTITIONER

## 2021-01-05 ASSESSMENT — PATIENT HEALTH QUESTIONNAIRE - PHQ9: CLINICAL INTERPRETATION OF PHQ2 SCORE: 0

## 2021-01-05 NOTE — ASSESSMENT & PLAN NOTE
Reports bilateral foot numbness and feels like he is walking golfballs.  a1c 5.4.  No diabetes meds.  Wondering if podiatry consult to rule out neuroma may be warranted.

## 2021-01-05 NOTE — PROGRESS NOTES
"Chief Complaint   Patient presents with   • Diabetic Neuropathy       Subjective:   Choco Chavarria is a 70 y.o. male here today for evaluation and management of:    Neuropathy (CMS-HCC) ([facility])  Reports bilateral foot numbness and feels like he is walking golfballs.  a1c 5.4.  No diabetes meds.  Wondering if podiatry consult to rule out neuroma may be warranted.     Essential hypertension  /66.  Taking lisinopril/hctz daily.  No chest pain reported.  Will do a bp log and follow up.  Reports some walking dogs.     History of MI (myocardial infarction)  On ace, plavix.  Not on a beta blocker or statin.  Will draw labs and follow up    Dyslipidemia  Due for lipids.  BMI 35.5.  Inconsistent CV exercise. Refuses statins at this time.          Current medicines (including changes today)  Current Outpatient Medications   Medication Sig Dispense Refill   • lisinopril-hydrochlorothiazide (PRINZIDE) 20-12.5 MG per tablet TAKE ONE TABLET BY MOUTH EVERY DAY 30 Tab 0   • clopidogrel (PLAVIX) 75 MG Tab Take 1 Tab by mouth every day. 90 Tab 2   • aspirin EC (ECOTRIN) 81 MG Tablet Delayed Response Take 81 mg by mouth every day.     • B Complex Vitamins (B COMPLEX PO) Take  by mouth.     • Omega-3 Fatty Acids (FISH OIL) 1000 MG Cap capsule Take 1,000 mg by mouth 2 Times a Day.       No current facility-administered medications for this visit.      He  has a past medical history of Apnea, sleep, Chickenpox, Daytime sleepiness, Elevated hemoglobin A1c, Frequent urination, Gasping for breath, Heart attack (HCC), Hyperlipidemia, Hypertension, Influenza, Mumps, Obesity, Ringing in ears, Sleep apnea, Snoring, Wears glasses, and Weight loss.    ROS as stated in hpi  No chest pain, no shortness of breath, no abdominal pain       Objective:     /66 (BP Location: Left arm, Patient Position: Sitting)   Pulse 60   Temp 36.7 °C (98 °F) (Temporal)   Resp 18   Ht 1.727 m (5' 8\")   Wt 106.1 kg (234 lb)   SpO2 95%  Body mass index is " 35.58 kg/m². bp stable.   Physical Exam:  Constitutional: Alert, no distress.  Skin: Warm, dry, good turgor,no cyanosis, no rashes in visible areas.  Eye: Equal, round and reactive, conjunctiva clear, lids normal..  Neck: Trachea midline, no masses, no obvious thyroid enlargement.. No cervical or supraclavicular lymphadenopathy. Range of motion within normal limits.  Neuro: Cranial nerves 2-12 grossly intact.  No sensory deficit.  Respiratory: Unlabored respiratory effort, lungs clear to auscultation, no wheezes, no ronchi.  Cardiovascular: Normal S1, S2, no murmur, no edema.  Psych: Alert and oriented x3, normal affect and mood and judgement.        Assessment and Plan:   The following treatment plan was discussed    1. Neuropathy  Chronic issue.  Patients description sounds like possible neuroma in feet.  Does not have diabetes.  Will refer to podiatry for consult.  Monitor.   - REFERRAL TO PODIATRY    2. Essential hypertension  Chronic ongoing stable.  Encouraged CV exercise and weight loss.  Monitor and follow bp at home.    - CBC WITH DIFFERENTIAL; Future  - Comp Metabolic Panel; Future    3. Pre-diabetes  Chronic, ongoing.  Last A1c 5.4.  Will recheck.  Encouraged weight loss and more consistent exercise.  Monitor.   - HEMOGLOBIN A1C; Future    4. Vitamin D deficiency  Due for labs.  Orders provided.  Monitor.   - VITAMIN D,25 HYDROXY; Future    5. Dyslipidemia  Chronic, ongoing. Elevated LDL.  Based on his hx of MI, he should be on statin, but patient refuses.  May be candidate for PK9 inhibitors.  Monitor and follow.   - Lipid Profile; Future    6. Encounter for hepatitis C screening test for low risk patient  Screening for low risk patient.  Monitor.   - HEP C VIRUS ANTIBODY; Future    7. History of MI (myocardial infarction)  Chronic issue.  Patient is not seeing cardiology.  He is on ace and aspirin. But not on a beta blocker or statin.  Will check labs and discuss with patient starting a beta blocker and  possible CT cardiac scoring.  Monitor and follow.       Followup: Return in about 1 year (around 1/5/2022) for Multiple issues.         Educated in proper administration of medication(s) ordered today including safety, possible SE, risks, benefits, rationale and alternatives to therapy.     Please note that this dictation was created using voice recognition software. I have made every reasonable attempt to correct obvious errors, but I expect that there are errors of grammar and possibly content that I did not discover before finalizing the note.

## 2021-01-05 NOTE — ASSESSMENT & PLAN NOTE
/66.  Taking lisinopril/hctz daily.  No chest pain reported.  Will do a bp log and follow up.  Reports some walking dogs.

## 2021-01-07 ENCOUNTER — PATIENT MESSAGE (OUTPATIENT)
Dept: MEDICAL GROUP | Facility: PHYSICIAN GROUP | Age: 71
End: 2021-01-07

## 2021-01-15 DIAGNOSIS — Z23 NEED FOR VACCINATION: ICD-10-CM

## 2021-01-22 ENCOUNTER — HOSPITAL ENCOUNTER (OUTPATIENT)
Dept: LAB | Facility: MEDICAL CENTER | Age: 71
End: 2021-01-22
Attending: NURSE PRACTITIONER
Payer: MEDICARE

## 2021-01-22 DIAGNOSIS — E55.9 VITAMIN D DEFICIENCY: ICD-10-CM

## 2021-01-22 DIAGNOSIS — I10 ESSENTIAL HYPERTENSION: ICD-10-CM

## 2021-01-22 DIAGNOSIS — R73.03 PRE-DIABETES: ICD-10-CM

## 2021-01-22 DIAGNOSIS — Z11.59 ENCOUNTER FOR HEPATITIS C SCREENING TEST FOR LOW RISK PATIENT: ICD-10-CM

## 2021-01-22 DIAGNOSIS — E78.5 DYSLIPIDEMIA: ICD-10-CM

## 2021-01-22 LAB
25(OH)D3 SERPL-MCNC: 45 NG/ML (ref 30–100)
ALBUMIN SERPL BCP-MCNC: 4.3 G/DL (ref 3.2–4.9)
ALBUMIN/GLOB SERPL: 1.4 G/DL
ALP SERPL-CCNC: 90 U/L (ref 30–99)
ALT SERPL-CCNC: 15 U/L (ref 2–50)
ANION GAP SERPL CALC-SCNC: 14 MMOL/L (ref 7–16)
AST SERPL-CCNC: 13 U/L (ref 12–45)
BASOPHILS # BLD AUTO: 0.1 % (ref 0–1.8)
BASOPHILS # BLD: 0.01 K/UL (ref 0–0.12)
BILIRUB SERPL-MCNC: 0.3 MG/DL (ref 0.1–1.5)
BUN SERPL-MCNC: 22 MG/DL (ref 8–22)
CALCIUM SERPL-MCNC: 9.9 MG/DL (ref 8.5–10.5)
CHLORIDE SERPL-SCNC: 98 MMOL/L (ref 96–112)
CHOLEST SERPL-MCNC: 184 MG/DL (ref 100–199)
CO2 SERPL-SCNC: 23 MMOL/L (ref 20–33)
CREAT SERPL-MCNC: 0.95 MG/DL (ref 0.5–1.4)
EOSINOPHIL # BLD AUTO: 0.01 K/UL (ref 0–0.51)
EOSINOPHIL NFR BLD: 0.1 % (ref 0–6.9)
ERYTHROCYTE [DISTWIDTH] IN BLOOD BY AUTOMATED COUNT: 49.1 FL (ref 35.9–50)
EST. AVERAGE GLUCOSE BLD GHB EST-MCNC: 128 MG/DL
FASTING STATUS PATIENT QL REPORTED: NORMAL
GLOBULIN SER CALC-MCNC: 3.1 G/DL (ref 1.9–3.5)
GLUCOSE SERPL-MCNC: 100 MG/DL (ref 65–99)
HBA1C MFR BLD: 6.1 % (ref 0–5.6)
HCT VFR BLD AUTO: 44.8 % (ref 42–52)
HCV AB SER QL: NORMAL
HDLC SERPL-MCNC: 45 MG/DL
HGB BLD-MCNC: 14.9 G/DL (ref 14–18)
IMM GRANULOCYTES # BLD AUTO: 0.03 K/UL (ref 0–0.11)
IMM GRANULOCYTES NFR BLD AUTO: 0.3 % (ref 0–0.9)
LDLC SERPL CALC-MCNC: 118 MG/DL
LYMPHOCYTES # BLD AUTO: 1.9 K/UL (ref 1–4.8)
LYMPHOCYTES NFR BLD: 16 % (ref 22–41)
MCH RBC QN AUTO: 32.9 PG (ref 27–33)
MCHC RBC AUTO-ENTMCNC: 33.3 G/DL (ref 33.7–35.3)
MCV RBC AUTO: 98.9 FL (ref 81.4–97.8)
MONOCYTES # BLD AUTO: 0.66 K/UL (ref 0–0.85)
MONOCYTES NFR BLD AUTO: 5.6 % (ref 0–13.4)
NEUTROPHILS # BLD AUTO: 9.28 K/UL (ref 1.82–7.42)
NEUTROPHILS NFR BLD: 77.9 % (ref 44–72)
NRBC # BLD AUTO: 0 K/UL
NRBC BLD-RTO: 0 /100 WBC
PLATELET # BLD AUTO: 248 K/UL (ref 164–446)
PMV BLD AUTO: 11.8 FL (ref 9–12.9)
POTASSIUM SERPL-SCNC: 3.9 MMOL/L (ref 3.6–5.5)
PROT SERPL-MCNC: 7.4 G/DL (ref 6–8.2)
RBC # BLD AUTO: 4.53 M/UL (ref 4.7–6.1)
SODIUM SERPL-SCNC: 135 MMOL/L (ref 135–145)
TRIGL SERPL-MCNC: 107 MG/DL (ref 0–149)
WBC # BLD AUTO: 11.9 K/UL (ref 4.8–10.8)

## 2021-01-22 PROCEDURE — 80053 COMPREHEN METABOLIC PANEL: CPT

## 2021-01-22 PROCEDURE — 85025 COMPLETE CBC W/AUTO DIFF WBC: CPT

## 2021-01-22 PROCEDURE — 80061 LIPID PANEL: CPT

## 2021-01-22 PROCEDURE — 83036 HEMOGLOBIN GLYCOSYLATED A1C: CPT | Mod: GA

## 2021-01-22 PROCEDURE — 82306 VITAMIN D 25 HYDROXY: CPT

## 2021-01-22 PROCEDURE — 36415 COLL VENOUS BLD VENIPUNCTURE: CPT

## 2021-01-22 PROCEDURE — 86803 HEPATITIS C AB TEST: CPT

## 2021-01-23 ENCOUNTER — IMMUNIZATION (OUTPATIENT)
Dept: FAMILY PLANNING/WOMEN'S HEALTH CLINIC | Facility: IMMUNIZATION CENTER | Age: 71
End: 2021-01-23
Attending: INTERNAL MEDICINE
Payer: MEDICARE

## 2021-01-23 DIAGNOSIS — Z23 NEED FOR VACCINATION: ICD-10-CM

## 2021-01-23 DIAGNOSIS — Z23 ENCOUNTER FOR VACCINATION: Primary | ICD-10-CM

## 2021-01-23 PROCEDURE — 91300 PFIZER SARS-COV-2 VACCINE: CPT

## 2021-01-23 PROCEDURE — 0001A PFIZER SARS-COV-2 VACCINE: CPT

## 2021-02-13 ENCOUNTER — IMMUNIZATION (OUTPATIENT)
Dept: FAMILY PLANNING/WOMEN'S HEALTH CLINIC | Facility: IMMUNIZATION CENTER | Age: 71
End: 2021-02-13
Attending: INTERNAL MEDICINE
Payer: MEDICARE

## 2021-02-13 DIAGNOSIS — Z23 ENCOUNTER FOR VACCINATION: Primary | ICD-10-CM

## 2021-02-13 PROCEDURE — 91300 PFIZER SARS-COV-2 VACCINE: CPT

## 2021-02-13 PROCEDURE — 0002A PFIZER SARS-COV-2 VACCINE: CPT

## 2021-03-31 ENCOUNTER — PATIENT MESSAGE (OUTPATIENT)
Dept: MEDICAL GROUP | Facility: PHYSICIAN GROUP | Age: 71
End: 2021-03-31

## 2021-04-01 RX ORDER — LISINOPRIL AND HYDROCHLOROTHIAZIDE 20; 12.5 MG/1; MG/1
1 TABLET ORAL
Qty: 90 TABLET | Refills: 0 | Status: SHIPPED | OUTPATIENT
Start: 2021-04-01 | End: 2021-05-23 | Stop reason: SDUPTHER

## 2021-04-01 NOTE — TELEPHONE ENCOUNTER
Requested Prescriptions     Signed Prescriptions Disp Refills   • lisinopril-hydrochlorothiazide (PRINZIDE) 20-12.5 MG per tablet 90 tablet 0     Sig: Take 1 tablet by mouth every day.     Authorizing Provider: SALLIE SETH     Has appointment in June  EDITA Recio

## 2021-04-01 NOTE — PATIENT COMMUNICATION
Phone Number Called: [phone] (home)       Call outcome: spoke to patient about refills and pharmacy change    Message:

## 2021-05-24 RX ORDER — LISINOPRIL AND HYDROCHLOROTHIAZIDE 20; 12.5 MG/1; MG/1
1 TABLET ORAL
Qty: 90 TABLET | Refills: 2 | Status: SHIPPED | OUTPATIENT
Start: 2021-05-24 | End: 2021-06-03 | Stop reason: SDUPTHER

## 2021-05-24 RX ORDER — CLOPIDOGREL BISULFATE 75 MG/1
75 TABLET ORAL
Qty: 90 TABLET | Refills: 2 | Status: SHIPPED | OUTPATIENT
Start: 2021-05-24 | End: 2021-06-03

## 2021-05-24 NOTE — TELEPHONE ENCOUNTER
Requested Prescriptions     Signed Prescriptions Disp Refills   • clopidogrel (PLAVIX) 75 MG Tab 90 tablet 2     Sig: Take 1 tablet by mouth every day.     Authorizing Provider: SALLIE SETH   • lisinopril-hydrochlorothiazide (PRINZIDE) 20-12.5 MG per tablet 90 tablet 2     Sig: Take 1 tablet by mouth every day.     Authorizing Provider: SALLIE SETH A.P.R.N.

## 2021-06-03 RX ORDER — CLOPIDOGREL BISULFATE 75 MG/1
75 TABLET ORAL
Qty: 90 TABLET | Refills: 3 | Status: SHIPPED
Start: 2021-06-03 | End: 2021-08-12

## 2021-06-03 RX ORDER — LISINOPRIL AND HYDROCHLOROTHIAZIDE 20; 12.5 MG/1; MG/1
1 TABLET ORAL
Qty: 90 TABLET | Refills: 3 | Status: SHIPPED | OUTPATIENT
Start: 2021-06-03 | End: 2021-11-03 | Stop reason: SDUPTHER

## 2021-06-03 NOTE — TELEPHONE ENCOUNTER
Received request via: Patient    Was the patient seen in the last year in this department? Yes    Does the patient have an active prescription (recently filled or refills available) for medication(s) requested? REQUESTING CHANGE OF PHARMACIES

## 2021-06-03 NOTE — TELEPHONE ENCOUNTER
----- Message from Comfort Nunez sent at 6/3/2021 12:13 PM PDT -----  Regarding: FW: prescription problem  Contact: [phone]    ----- Message -----  From: Choco Chavarria  Sent: 6/3/2021  10:28 AM PDT  To: Amb All Mas  Subject: RE: prescription problem                         No, I have my blood pressue meds (lisinop) and will be home in 2 days. I won't have my Plavix med for a day or so.  I should be okay.    Remind me to kick myself if I ever  want to use [facility] again!    Please ensure Bernarda gets both lisinop and Plavix refilled for 90 days so I can pick them up ASAP.    Many thanks for your kind assistance.    ----- Message -----  From: Medical Assistant Amanda RUBALCAVA  Sent: 6/1/21 16:46  To: Choco Chavarria  Subject: RE: prescription problem    Nieves Wilhelm we just received your message, it was sent to a customer service request pool instead of to our pool. I have updated the pharmacy in your chart for the future. Is there a pharmacy where you are at the you would like us to send a refill?     Thank you  Alexys Yoder Ass't        ----- Message -----       From:Choco Chavarria       Sent:5/26/2021  5:47 PM PDT         To:Patient Customer Service Request Message List    Subject:prescription problem    Topic: Technical Quality    Viri, I am having nothing but problems with [facility]. I did not get my prescription refilled at [facility]. Can we go back to [facility] in [place] please.    We are going out of town tomorrow @ 2 PM. Would it be possible to send my Plavix prescription to [facility] ASAP so I don't run out on my trip?

## 2021-06-04 NOTE — TELEPHONE ENCOUNTER
Requested Prescriptions     Signed Prescriptions Disp Refills   • clopidogrel (PLAVIX) 75 MG Tab 90 tablet 3     Sig: Take 1 tablet by mouth every day.     Authorizing Provider: SALLIE SETH   • lisinopril-hydrochlorothiazide (PRINZIDE) 20-12.5 MG per tablet 90 tablet 3     Sig: Take 1 tablet by mouth every day.     Authorizing Provider: SALLIE SETH A.P.R.N.

## 2021-06-22 ENCOUNTER — OFFICE VISIT (OUTPATIENT)
Dept: MEDICAL GROUP | Facility: PHYSICIAN GROUP | Age: 71
End: 2021-06-22
Payer: MEDICARE

## 2021-06-22 VITALS
HEART RATE: 64 BPM | BODY MASS INDEX: 37.28 KG/M2 | RESPIRATION RATE: 16 BRPM | OXYGEN SATURATION: 95 % | WEIGHT: 246 LBS | TEMPERATURE: 97.5 F | SYSTOLIC BLOOD PRESSURE: 138 MMHG | HEIGHT: 68 IN | DIASTOLIC BLOOD PRESSURE: 60 MMHG

## 2021-06-22 DIAGNOSIS — G62.9 NEUROPATHY: ICD-10-CM

## 2021-06-22 DIAGNOSIS — I25.2 HISTORY OF MI (MYOCARDIAL INFARCTION): ICD-10-CM

## 2021-06-22 DIAGNOSIS — E66.9 OBESITY (BMI 35.0-39.9 WITHOUT COMORBIDITY): ICD-10-CM

## 2021-06-22 DIAGNOSIS — Z71.9 ENCOUNTER FOR CONSULTATION: ICD-10-CM

## 2021-06-22 DIAGNOSIS — E78.5 DYSLIPIDEMIA: ICD-10-CM

## 2021-06-22 DIAGNOSIS — G47.33 OSA ON CPAP: ICD-10-CM

## 2021-06-22 DIAGNOSIS — Z23 NEED FOR VACCINATION: ICD-10-CM

## 2021-06-22 PROCEDURE — 90750 HZV VACC RECOMBINANT IM: CPT | Performed by: NURSE PRACTITIONER

## 2021-06-22 PROCEDURE — 90471 IMMUNIZATION ADMIN: CPT | Performed by: NURSE PRACTITIONER

## 2021-06-22 PROCEDURE — 99214 OFFICE O/P EST MOD 30 MIN: CPT | Mod: 25 | Performed by: NURSE PRACTITIONER

## 2021-06-22 ASSESSMENT — FIBROSIS 4 INDEX: FIB4 SCORE: 0.95

## 2021-06-22 NOTE — ASSESSMENT & PLAN NOTE
Diagnosed on EKG 20 years ago.  No echo or stress test at that time.  Not started on beta blocker.  Patient states the statin started his neuropathy.

## 2021-06-22 NOTE — ASSESSMENT & PLAN NOTE
Not currently on a statin, reported some neuropathy results.    Results for LAWRENCE MAJOR (MRN [id number]) as of 6/22/2021 10:22   Ref. Range 1/22/2021 13:35   Cholesterol,Tot Latest Ref Range: 100 - 199 mg/dL 184   Triglycerides Latest Ref Range: 0 - 149 mg/dL 107   HDL Latest Ref Range: >=40 mg/dL 45   LDL Latest Ref Range: <100 mg/dL 118 (H)

## 2021-06-22 NOTE — PROGRESS NOTES
Chief Complaint   Patient presents with   • Medicare Annual Wellness       Subjective:   Lawrence Major is a 70 y.o. male here today for evaluation and management of:    History of MI (myocardial infarction)  Diagnosed on EKG 20 years ago.  No echo or stress test at that time.  Not started on beta blocker.  Patient states the statin started his neuropathy.      History of MI (myocardial infarction)  Diagnosed on EKG 20 years ago.  No echo or stress test at that time.  Not started on beta blocker.  Patient states the statin started his neuropathy.     MANNY on CPAP  bipap nightly.  Reports 100% compliance.     Neuropathy (CMS-HCC) ([facility])  regenative medicine is looking at some stem cell injections.  Working on weight loss.     Dyslipidemia  Not currently on a statin, reported some neuropathy results.    Results for LAWRENCE MAJOR (MRN [id number]) as of 6/22/2021 10:22   Ref. Range 1/22/2021 13:35   Cholesterol,Tot Latest Ref Range: 100 - 199 mg/dL 184   Triglycerides Latest Ref Range: 0 - 149 mg/dL 107   HDL Latest Ref Range: >=40 mg/dL 45   LDL Latest Ref Range: <100 mg/dL 118 (H)       Obesity (BMI 35.0-39.9 without comorbidity) ([facility])  BMI 37.4 with increased weight since January.     Current medicines (including changes today)  Current Outpatient Medications   Medication Sig Dispense Refill   • clopidogrel (PLAVIX) 75 MG Tab Take 1 tablet by mouth every day. 90 tablet 3   • lisinopril-hydrochlorothiazide (PRINZIDE) 20-12.5 MG per tablet Take 1 tablet by mouth every day. 90 tablet 3   • aspirin EC (ECOTRIN) 81 MG Tablet Delayed Response Take 81 mg by mouth every day.     • B Complex Vitamins (B COMPLEX PO) Take  by mouth.     • Omega-3 Fatty Acids (FISH OIL) 1000 MG Cap capsule Take 1,000 mg by mouth 2 Times a Day.       No current facility-administered medications for this visit.     He  has a past medical history of Apnea, sleep, Chickenpox, Daytime sleepiness, Elevated hemoglobin A1c, Frequent urination, Gasping for  "breath, Heart attack (HCC), Hyperlipidemia, Hypertension, Influenza, Mumps, Obesity, Ringing in ears, Sleep apnea, Snoring, Wears glasses, and Weight loss.    ROS as stated in hpi  No chest pain, no shortness of breath, no abdominal pain       Objective:     /60 (BP Location: Left arm, Patient Position: Sitting)   Pulse 64   Temp 36.4 °C (97.5 °F) (Temporal)   Resp 16   Ht 1.727 m (5' 8\")   Wt 112 kg (246 lb)   SpO2 95%  Body mass index is 37.4 kg/m².   Physical Exam:  Constitutional: Alert, no distress.  Skin: Warm, dry, good turgor,no cyanosis, no rashes in visible areas.  Neck: Trachea midline, no masses, no obvious thyroid enlargement.. No cervical or supraclavicular lymphadenopathy. Range of motion within normal limits.  Neuro: Cranial nerves 2-12 grossly intact.  No sensory deficit.  Respiratory: Unlabored respiratory effort, lungs clear to auscultation, no wheezes, no ronchi.  Cardiovascular: Normal S1, S2, no murmur, no edema.  Abdomen: Soft, non-tender, no masses, no guarding,  no hepatosplenomegaly.  Psych: Alert and oriented x3, normal affect and mood and judgement.        Assessment and Plan:   The following treatment plan was discussed    1. Need for vaccination  Due for vaccine.  No acute illness today  I have placed the below orders and discussed them with an approved delegating provider.  The MA is performing the below orders under the direction of dr. beach.  - Shingrix Vaccine    2. History of MI (myocardial infarction)  Historical issue.  Based on EKG, no other workup done.  Taking plavix and baby asa.  Unable to take statin due to side effects.  Not on beta blocker.  Will do CT cardiac score as baseline.  Consider referral to cardiology.   - CT-CARDIAC SCORING; Future      3. MANNY on CPAP  Chronic, ongoing, compliant.  bp in control.      4. Neuropathy  Chronic, ongoing, has some stem cell treatment in feet. Monitor and follow.   [facility] plaquard papers signed    5. Dyslipidemia  Chronic " issue, no meds.  LDL at 118.  Discussed diet , exercise and weight loss.  Monitor.     6. Obesity (BMI 35.0-39.9 without comorbidity) (HCC)  Chronic, ongoing, worsening.  Encouraged to increase exercise and weight loss.       Followup: No follow-ups on file.         Educated in proper administration of medication(s) ordered today including safety, possible SE, risks, benefits, rationale and alternatives to therapy.     Please note that this dictation was created using voice recognition software. I have made every reasonable attempt to correct obvious errors, but I expect that there are errors of grammar and possibly content that I did not discover before finalizing the note.

## 2021-07-09 ENCOUNTER — TELEPHONE (OUTPATIENT)
Dept: CARDIOLOGY | Facility: MEDICAL CENTER | Age: 71
End: 2021-07-09

## 2021-07-09 NOTE — TELEPHONE ENCOUNTER
Chart Prep    Called patient in regards to records for NP appointment with Dr. Hooper on 07/26/2021 at 11:00am. To review most recent lab results, ekg, any cardiac testing or ,if he has been treated by a cardiologist. No answer, left voicemail to call back

## 2021-07-14 ENCOUNTER — HOSPITAL ENCOUNTER (OUTPATIENT)
Dept: RADIOLOGY | Facility: MEDICAL CENTER | Age: 71
End: 2021-07-14
Attending: NURSE PRACTITIONER
Payer: COMMERCIAL

## 2021-07-14 DIAGNOSIS — I25.2 HISTORY OF MI (MYOCARDIAL INFARCTION): ICD-10-CM

## 2021-07-14 PROCEDURE — 4410556 CT-CARDIAC SCORING (SELF PAY ONLY)

## 2021-08-12 ENCOUNTER — OFFICE VISIT (OUTPATIENT)
Dept: CARDIOLOGY | Facility: MEDICAL CENTER | Age: 71
End: 2021-08-12
Payer: MEDICARE

## 2021-08-12 VITALS
BODY MASS INDEX: 37.74 KG/M2 | DIASTOLIC BLOOD PRESSURE: 62 MMHG | HEART RATE: 63 BPM | OXYGEN SATURATION: 96 % | RESPIRATION RATE: 16 BRPM | SYSTOLIC BLOOD PRESSURE: 136 MMHG | HEIGHT: 68 IN | WEIGHT: 249 LBS

## 2021-08-12 DIAGNOSIS — I10 HTN (HYPERTENSION), MALIGNANT: ICD-10-CM

## 2021-08-12 DIAGNOSIS — R94.31 NONSPECIFIC ABNORMAL ELECTROCARDIOGRAM (ECG) (EKG): ICD-10-CM

## 2021-08-12 PROCEDURE — 93000 ELECTROCARDIOGRAM COMPLETE: CPT | Performed by: INTERNAL MEDICINE

## 2021-08-12 PROCEDURE — 99203 OFFICE O/P NEW LOW 30 MIN: CPT | Mod: 25 | Performed by: INTERNAL MEDICINE

## 2021-08-12 ASSESSMENT — ENCOUNTER SYMPTOMS
DIZZINESS: 0
ORTHOPNEA: 0
COUGH: 0
DEPRESSION: 0
HEADACHES: 0
HALLUCINATIONS: 0
EYE PAIN: 0
BLOOD IN STOOL: 0
VOMITING: 0
FALLS: 0
WEIGHT LOSS: 0
SHORTNESS OF BREATH: 0
PALPITATIONS: 0
LOSS OF CONSCIOUSNESS: 0
MYALGIAS: 0
ABDOMINAL PAIN: 0
CLAUDICATION: 0
PND: 0
BLURRED VISION: 0
BRUISES/BLEEDS EASILY: 0
SPEECH CHANGE: 0
SENSORY CHANGE: 0
CHILLS: 0
DOUBLE VISION: 0
FEVER: 0
EYE DISCHARGE: 0
NAUSEA: 0

## 2021-08-12 ASSESSMENT — FIBROSIS 4 INDEX: FIB4 SCORE: 0.95

## 2021-08-12 NOTE — PROGRESS NOTES
Chief Complaint   Patient presents with   • HTN (Controlled)   • Old MI (> 6 Months)       Subjective     Choco Chavarria is a 70 y.o. male who presents today for cardiac care and management due to prior history of abnormal EKG, hypertension.    Patient was also concerned about continuing clopidogrel medical therapy which was started by his primary care physician. He was told that he might have had an old heart attack based on EKG finding. However, at that time, he did not have any invasive coronary angiogram or cardiac catheterization. He just moved to the area 5 years ago and would like to establish with cardiologist.    I have personally interpreted EKG today with patient, there is no evidence of acute coronary syndrome, no evidence of prior infarct, normal NE and QT interval, no significant conduction disease. Sinus rhythm.    No family history of sudden cardiac death.    Past Medical History:   Diagnosis Date   • Apnea, sleep    • Chickenpox    • Daytime sleepiness    • Elevated hemoglobin A1c    • Frequent urination    • Gasping for breath    • Heart attack (HCC)    • Hyperlipidemia    • Hypertension    • Influenza    • Mumps    • Obesity    • Ringing in ears    • Sleep apnea    • Snoring    • Wears glasses    • Weight loss      Past Surgical History:   Procedure Laterality Date   • APPENDECTOMY       Family History   Problem Relation Age of Onset   • Cancer Mother    • Lung Disease Mother    • Psychiatric Illness Father      Social History     Socioeconomic History   • Marital status:      Spouse name: Not on file   • Number of children: Not on file   • Years of education: Not on file   • Highest education level: Not on file   Occupational History   • Not on file   Tobacco Use   • Smoking status: Former Smoker     Packs/day: 0.00     Years: 40.00     Pack years: 0.00     Types: Cigarettes     Quit date: 3/9/2016     Years since quittin.4   • Smokeless tobacco: Never Used   Vaping Use   • Vaping  Use: Never used   Substance and Sexual Activity   • Alcohol use: Yes     Alcohol/week: 0.6 oz     Types: 1 Cans of beer per week     Comment: rare   • Drug use: No   • Sexual activity: Yes     Partners: Female   Other Topics Concern   • Not on file   Social History Narrative   • Not on file     Social Determinants of Health     Financial Resource Strain:    • Difficulty of Paying Living Expenses:    Food Insecurity:    • Worried About Running Out of Food in the Last Year:    • Ran Out of Food in the Last Year:    Transportation Needs:    • Lack of Transportation (Medical):    • Lack of Transportation (Non-Medical):    Physical Activity:    • Days of Exercise per Week:    • Minutes of Exercise per Session:    Stress:    • Feeling of Stress :    Social Connections:    • Frequency of Communication with Friends and Family:    • Frequency of Social Gatherings with Friends and Family:    • Attends Hinduism Services:    • Active Member of Clubs or Organizations:    • Attends Club or Organization Meetings:    • Marital Status:    Intimate Partner Violence:    • Fear of Current or Ex-Partner:    • Emotionally Abused:    • Physically Abused:    • Sexually Abused:      No Known Allergies  Outpatient Encounter Medications as of 8/12/2021   Medication Sig Dispense Refill   • lisinopril-hydrochlorothiazide (PRINZIDE) 20-12.5 MG per tablet Take 1 tablet by mouth every day. 90 tablet 3   • aspirin EC (ECOTRIN) 81 MG Tablet Delayed Response Take 81 mg by mouth every day.     • B Complex Vitamins (B COMPLEX PO) Take  by mouth.     • Omega-3 Fatty Acids (FISH OIL) 1000 MG Cap capsule Take 1,000 mg by mouth 2 Times a Day.     • [DISCONTINUED] clopidogrel (PLAVIX) 75 MG Tab Take 1 tablet by mouth every day. 90 tablet 3     No facility-administered encounter medications on file as of 8/12/2021.     Review of Systems   Constitutional: Negative for chills, fever, malaise/fatigue and weight loss.   HENT: Negative for ear discharge, ear  "pain, hearing loss and nosebleeds.    Eyes: Negative for blurred vision, double vision, pain and discharge.   Respiratory: Negative for cough and shortness of breath.    Cardiovascular: Negative for chest pain, palpitations, orthopnea, claudication, leg swelling and PND.   Gastrointestinal: Negative for abdominal pain, blood in stool, melena, nausea and vomiting.   Genitourinary: Negative for dysuria and hematuria.   Musculoskeletal: Negative for falls, joint pain and myalgias.   Skin: Negative for itching and rash.   Neurological: Negative for dizziness, sensory change, speech change, loss of consciousness and headaches.   Endo/Heme/Allergies: Negative for environmental allergies. Does not bruise/bleed easily.   Psychiatric/Behavioral: Negative for depression, hallucinations and suicidal ideas.              Objective     /62 (BP Location: Left arm, Patient Position: Sitting, BP Cuff Size: Adult)   Pulse 63   Resp 16   Ht 1.727 m (5' 8\")   Wt 113 kg (249 lb)   SpO2 96%   BMI 37.86 kg/m²     Physical Exam   Constitutional: He is oriented to person, place, and time. No distress.   HENT:   Head: Normocephalic and atraumatic.   Right Ear: External ear normal.   Left Ear: External ear normal.   Eyes: Right eye exhibits no discharge. Left eye exhibits no discharge.   Neck: No JVD present. No thyromegaly present.   Cardiovascular: Normal rate, regular rhythm and normal heart sounds. Exam reveals no gallop and no friction rub.   No murmur heard.  Pulmonary/Chest: Breath sounds normal. No respiratory distress.   Abdominal: Bowel sounds are normal. He exhibits no distension. There is no abdominal tenderness.   Musculoskeletal:         General: No tenderness.   Neurological: He is alert and oriented to person, place, and time. No cranial nerve deficit.   Skin: Skin is warm and dry. He is not diaphoretic.   Psychiatric: His behavior is normal.   Nursing note and vitals reviewed.             Assessment & Plan     1. " HTN (hypertension), malignant  EC-ECHOCARDIOGRAM COMPLETE W/O CONT    Treadmill Stress   2. Nonspecific abnormal electrocardiogram (ECG) (EKG)         Medical Decision Making: Today's Assessment/Status/Plan:      At this time, I do not see an absolute indication to continue clopidogrel therapy. Advised patient to stop clopidogrel at this time. Blood pressures well controlled. We'll continue current medication for blood pressure control.    In the meantime, for risk ratification, we will obtain an echocardiogram along with treadmill stress test.

## 2021-08-13 LAB — EKG IMPRESSION: NORMAL

## 2021-08-18 ENCOUNTER — APPOINTMENT (OUTPATIENT)
Dept: CARDIOLOGY | Facility: MEDICAL CENTER | Age: 71
End: 2021-08-18
Attending: INTERNAL MEDICINE
Payer: MEDICARE

## 2021-08-23 ENCOUNTER — NON-PROVIDER VISIT (OUTPATIENT)
Dept: MEDICAL GROUP | Facility: PHYSICIAN GROUP | Age: 71
End: 2021-08-23
Payer: MEDICARE

## 2021-08-23 DIAGNOSIS — Z23 NEED FOR VACCINATION: ICD-10-CM

## 2021-08-23 PROCEDURE — 90750 HZV VACC RECOMBINANT IM: CPT | Performed by: NURSE PRACTITIONER

## 2021-08-23 PROCEDURE — 90471 IMMUNIZATION ADMIN: CPT | Performed by: NURSE PRACTITIONER

## 2021-08-23 NOTE — PROGRESS NOTES
"Choco Chavarria is a 70 y.o. male here for a non-provider visit for:   SHINGRIX (Shingles)    Reason for immunization: continue or complete series started at the office  Immunization records indicate need for vaccine: Yes, confirmed with Epic  Minimum interval has been met for this vaccine: Yes  ABN completed: Yes    VIS Dated   was given to patient: Yes  All IAC Questionnaire questions were answered \"No.\"    Patient tolerated injection and no adverse effects were observed or reported: Yes    Pt scheduled for next dose in series: Not Indicated  "

## 2021-09-01 ENCOUNTER — OFFICE VISIT (OUTPATIENT)
Dept: MEDICAL GROUP | Facility: PHYSICIAN GROUP | Age: 71
End: 2021-09-01
Payer: MEDICARE

## 2021-09-01 ENCOUNTER — HOSPITAL ENCOUNTER (OUTPATIENT)
Facility: MEDICAL CENTER | Age: 71
End: 2021-09-01
Attending: NURSE PRACTITIONER
Payer: MEDICARE

## 2021-09-01 VITALS
DIASTOLIC BLOOD PRESSURE: 72 MMHG | TEMPERATURE: 97.3 F | RESPIRATION RATE: 16 BRPM | OXYGEN SATURATION: 96 % | WEIGHT: 240 LBS | SYSTOLIC BLOOD PRESSURE: 128 MMHG | HEART RATE: 70 BPM | BODY MASS INDEX: 35.55 KG/M2 | HEIGHT: 69 IN

## 2021-09-01 DIAGNOSIS — R30.0 DYSURIA: ICD-10-CM

## 2021-09-01 DIAGNOSIS — G62.9 NEUROPATHY: ICD-10-CM

## 2021-09-01 LAB
APPEARANCE UR: CLEAR
BILIRUB UR STRIP-MCNC: NEGATIVE MG/DL
COLOR UR AUTO: YELLOW
GLUCOSE UR STRIP.AUTO-MCNC: NEGATIVE MG/DL
KETONES UR STRIP.AUTO-MCNC: NEGATIVE MG/DL
LEUKOCYTE ESTERASE UR QL STRIP.AUTO: NEGATIVE
NITRITE UR QL STRIP.AUTO: NEGATIVE
PH UR STRIP.AUTO: 6 [PH] (ref 5–8)
PROT UR QL STRIP: 30 MG/DL
RBC UR QL AUTO: NORMAL
SP GR UR STRIP.AUTO: 1.02
UROBILINOGEN UR STRIP-MCNC: 0.2 MG/DL

## 2021-09-01 PROCEDURE — 81002 URINALYSIS NONAUTO W/O SCOPE: CPT | Performed by: NURSE PRACTITIONER

## 2021-09-01 PROCEDURE — 87086 URINE CULTURE/COLONY COUNT: CPT

## 2021-09-01 PROCEDURE — 99213 OFFICE O/P EST LOW 20 MIN: CPT | Performed by: NURSE PRACTITIONER

## 2021-09-01 RX ORDER — CIPROFLOXACIN 500 MG/1
500 TABLET, FILM COATED ORAL 2 TIMES DAILY
Qty: 28 TABLET | Refills: 0 | Status: SHIPPED
Start: 2021-09-01 | End: 2021-11-03

## 2021-09-01 ASSESSMENT — FIBROSIS 4 INDEX: FIB4 SCORE: 0.95

## 2021-09-01 NOTE — PROGRESS NOTES
"Chief Complaint   Patient presents with   • Painful Urination   • Urinary Frequency       Subjective:   Choco Chavarria is a 70 y.o. male here today for evaluation and management of:    Neuropathy (CMS-HCC) (HCC)  Doing regen treatment and this is helping.     Dysuria  Started to have burning, urgency,frequency with urination 3 days ago. No fever, chills, n/v, flank pain.  No hx of kidney stone.  No recent bike riding.  Possible recent dehydration.  poct urine shows large blood.  No penile discharge reported.  Suspect acute prostatitis.          Current medicines (including changes today)  Current Outpatient Medications   Medication Sig Dispense Refill   • ciprofloxacin (CIPRO) 500 MG Tab Take 1 Tablet by mouth 2 times a day. 28 Tablet 0   • lisinopril-hydrochlorothiazide (PRINZIDE) 20-12.5 MG per tablet Take 1 tablet by mouth every day. 90 tablet 3   • aspirin EC (ECOTRIN) 81 MG Tablet Delayed Response Take 81 mg by mouth every day.     • B Complex Vitamins (B COMPLEX PO) Take  by mouth.     • Omega-3 Fatty Acids (FISH OIL) 1000 MG Cap capsule Take 1,000 mg by mouth 2 Times a Day.       No current facility-administered medications for this visit.     He  has a past medical history of Apnea, sleep, Chickenpox, Daytime sleepiness, Elevated hemoglobin A1c, Frequent urination, Gasping for breath, Heart attack (HCC), Hyperlipidemia, Hypertension, Influenza, Mumps, Obesity, Ringing in ears, Sleep apnea, Snoring, Wears glasses, and Weight loss.    ROS as stated in hpi  No chest pain, no shortness of breath, no abdominal pain       Objective:     /72 (BP Location: Left arm, Patient Position: Sitting)   Pulse 70   Temp 36.3 °C (97.3 °F) (Temporal)   Resp 16   Ht 1.753 m (5' 9\")   Wt 109 kg (240 lb)   SpO2 96%  Body mass index is 35.44 kg/m².   Physical Exam:  Constitutional: Alert, no distress.  Skin: Warm, dry, good turgor,no cyanosis, no rashes in visible areas.  Eye: Equal, round and reactive, conjunctiva " clear, lids normal.  Neck: Trachea midline, no masses, no obvious thyroid enlargement.. No cervical or supraclavicular lymphadenopathy. Range of motion within normal limits.  Neuro: Cranial nerves 2-12 grossly intact.  No sensory deficit.  Respiratory: Unlabored respiratory effort,Abdomen: Soft, non-tender, no masses, no guarding,  no hepatosplenomegaly. No CVA tenderness  Psych: Alert and oriented x3, normal affect and mood and judgement.        Assessment and Plan:   The following treatment plan was discussed    1. Dysuria  Acute, non complicated issue.  Suspect prostatitis.  ciipro bid X 14 days.  Increase fluids,  Azo as needed.  Follow up in one week with update.  If symptoms do not resolve, will send to urology.  Low suspicion for kidney stone.  Monitor and follow.   - POCT Urinalysis    2. Neuropathy  Chronic, ongoing, improving with new regen infusions.  Monitor.       Followup: No follow-ups on file.         Educated in proper administration of medication(s) ordered today including safety, possible SE, risks, benefits, rationale and alternatives to therapy.     Please note that this dictation was created using voice recognition software. I have made every reasonable attempt to correct obvious errors, but I expect that there are errors of grammar and possibly content that I did not discover before finalizing the note.

## 2021-09-01 NOTE — ASSESSMENT & PLAN NOTE
Started to have burning, urgency,frequency with urination 3 days ago. No fever, chills, n/v, flank pain.  No hx of kidney stone.  No recent bike riding.  Possible recent dehydration.  poct urine shows large blood.  No penile discharge reported.  Suspect acute prostatitis.

## 2021-09-02 ENCOUNTER — PATIENT MESSAGE (OUTPATIENT)
Dept: MEDICAL GROUP | Facility: PHYSICIAN GROUP | Age: 71
End: 2021-09-02

## 2021-09-02 DIAGNOSIS — R30.0 DYSURIA: ICD-10-CM

## 2021-09-04 LAB
BACTERIA UR CULT: NORMAL
SIGNIFICANT IND 70042: NORMAL
SITE SITE: NORMAL
SOURCE SOURCE: NORMAL

## 2021-09-21 DIAGNOSIS — I10 HTN (HYPERTENSION), MALIGNANT: Primary | ICD-10-CM

## 2021-09-27 ENCOUNTER — NON-PROVIDER VISIT (OUTPATIENT)
Dept: URGENT CARE | Facility: PHYSICIAN GROUP | Age: 71
End: 2021-09-27
Payer: MEDICARE

## 2021-09-27 DIAGNOSIS — Z23 NEED FOR VACCINATION: ICD-10-CM

## 2021-09-27 PROCEDURE — G0008 ADMIN INFLUENZA VIRUS VAC: HCPCS | Performed by: NURSE PRACTITIONER

## 2021-09-27 PROCEDURE — 90662 IIV NO PRSV INCREASED AG IM: CPT | Performed by: NURSE PRACTITIONER

## 2021-09-27 NOTE — PROGRESS NOTES
"Choco Chavarria is a 70 y.o. male here for a non-provider visit for:   FLU    Reason for immunization: Annual Flu Vaccine  Immunization records indicate need for vaccine: Yes, confirmed with Epic  Minimum interval has been met for this vaccine: Yes  ABN completed: Not Indicated    VIS Dated  8/6/21 was given to patient: Yes  All IAC Questionnaire questions were answered \"No.\"    Patient tolerated injection and no adverse effects were observed or reported: Yes    Pt scheduled for next dose in series: Not Indicated  "

## 2021-09-29 ENCOUNTER — APPOINTMENT (OUTPATIENT)
Dept: RADIOLOGY | Facility: MEDICAL CENTER | Age: 71
End: 2021-09-29
Attending: INTERNAL MEDICINE
Payer: MEDICARE

## 2021-10-29 ENCOUNTER — HOSPITAL ENCOUNTER (OUTPATIENT)
Dept: CARDIOLOGY | Facility: MEDICAL CENTER | Age: 71
End: 2021-10-29
Attending: INTERNAL MEDICINE
Payer: MEDICARE

## 2021-10-29 DIAGNOSIS — I10 HTN (HYPERTENSION), MALIGNANT: ICD-10-CM

## 2021-10-29 LAB
LV EJECT FRACT  99904: 55
LV EJECT FRACT MOD 2C 99903: 50.41
LV EJECT FRACT MOD 4C 99902: 59.19
LV EJECT FRACT MOD BP 99901: 56.33

## 2021-10-29 PROCEDURE — 93306 TTE W/DOPPLER COMPLETE: CPT

## 2021-10-29 PROCEDURE — 93306 TTE W/DOPPLER COMPLETE: CPT | Mod: 26 | Performed by: INTERNAL MEDICINE

## 2021-11-01 DIAGNOSIS — G47.33 OSA ON CPAP: ICD-10-CM

## 2021-11-03 ENCOUNTER — OFFICE VISIT (OUTPATIENT)
Dept: MEDICAL GROUP | Facility: PHYSICIAN GROUP | Age: 71
End: 2021-11-03
Payer: MEDICARE

## 2021-11-03 VITALS
OXYGEN SATURATION: 93 % | HEIGHT: 68 IN | SYSTOLIC BLOOD PRESSURE: 110 MMHG | RESPIRATION RATE: 18 BRPM | BODY MASS INDEX: 38.19 KG/M2 | DIASTOLIC BLOOD PRESSURE: 40 MMHG | WEIGHT: 252 LBS | TEMPERATURE: 97.3 F | HEART RATE: 68 BPM

## 2021-11-03 DIAGNOSIS — I10 ESSENTIAL HYPERTENSION: ICD-10-CM

## 2021-11-03 DIAGNOSIS — G47.33 OSA ON CPAP: ICD-10-CM

## 2021-11-03 DIAGNOSIS — Z02.89 ENCOUNTER FOR COMPLETION OF FORM WITH PATIENT: ICD-10-CM

## 2021-11-03 PROCEDURE — 99214 OFFICE O/P EST MOD 30 MIN: CPT | Performed by: NURSE PRACTITIONER

## 2021-11-03 RX ORDER — LISINOPRIL AND HYDROCHLOROTHIAZIDE 20; 12.5 MG/1; MG/1
1 TABLET ORAL
Qty: 90 TABLET | Refills: 1 | Status: SHIPPED
Start: 2021-11-03 | End: 2021-11-30

## 2021-11-03 ASSESSMENT — FIBROSIS 4 INDEX: FIB4 SCORE: 0.95

## 2021-11-03 NOTE — ASSESSMENT & PLAN NOTE
cpap was recalled.  Updated referral to pulmonology/sleep sent 11/1/21.  Reports worsening apnea.

## 2021-11-03 NOTE — PROGRESS NOTES
"Chief Complaint   Patient presents with   • Paperwork   • Apnea       Subjective:   Choco Chavarria is a 70 y.o. male here today for evaluation and management of:    MANNY on CPAP  cpap was recalled.  Updated referral to pulmonology/sleep sent 11/1/21.  Reports worsening apnea.      Essential hypertension  bp well controlled at 110/40 today.  No chest pain.  Reports walking regularly.      Encounter for completion of form with patient  Here for completion of DMV form today.  No reported issues with driving.  Not on any medication that would preclude him from driving.  Form completed.             Current medicines (including changes today)  Current Outpatient Medications   Medication Sig Dispense Refill   • lisinopril-hydrochlorothiazide (PRINZIDE) 20-12.5 MG per tablet Take 1 Tablet by mouth every day. 90 Tablet 1   • aspirin EC (ECOTRIN) 81 MG Tablet Delayed Response Take 81 mg by mouth every day.     • B Complex Vitamins (B COMPLEX PO) Take  by mouth.     • Omega-3 Fatty Acids (FISH OIL) 1000 MG Cap capsule Take 1,000 mg by mouth 2 Times a Day.       No current facility-administered medications for this visit.     He  has a past medical history of Apnea, sleep, Chickenpox, Daytime sleepiness, Elevated hemoglobin A1c, Frequent urination, Gasping for breath, Heart attack (HCC), Hyperlipidemia, Hypertension, Influenza, Mumps, Obesity, Ringing in ears, Sleep apnea, Snoring, Wears glasses, and Weight loss.    ROS as stated in hpi  No chest pain, no shortness of breath, no abdominal pain       Objective:     /40 (BP Location: Left arm, Patient Position: Sitting)   Pulse 68   Temp 36.3 °C (97.3 °F) (Temporal)   Resp 18   Ht 1.727 m (5' 8\")   Wt 114 kg (252 lb)   SpO2 93%  Body mass index is 38.32 kg/m².   Physical Exam:  Constitutional: Alert, no distress.  Skin: Warm, dry, good turgor,no cyanosis, no rashes in visible areas.  Eye: Equal, round and reactive, conjunctiva clear, lids normal.  Neck: Trachea " midline, no masses, no obvious thyroid enlargement.. No cervical or supraclavicular lymphadenopathy. Range of motion within normal limits.  Neuro: Cranial nerves 2-12 grossly intact.  No sensory deficit.  Respiratory: Unlabored respiratory effort,Psych: Alert and oriented x3, normal affect and mood and judgement.        Assessment and Plan:   The following treatment plan was discussed    1. MANNY on CPAP  Chronic, ongoing. Worsening since he has stopped using the recalled cpap.  Updated referral placed.  Advised to reach out to pulmonology to establish with new provider (his left) so he can get new cpap ordered.  Also advised per the sleep center to order bacteria filter for his current cpap, continue to use to avoid apneic events.  Monitor.     2. Encounter for completion of form with patient  Here for DMV form.  Completed.     3. Essential hypertension  Chornic, ongoing. Stable, well controlled RX refill today      Followup: No follow-ups on file.         Educated in proper administration of medication(s) ordered today including safety, possible SE, risks, benefits, rationale and alternatives to therapy.     Please note that this dictation was created using voice recognition software. I have made every reasonable attempt to correct obvious errors, but I expect that there are errors of grammar and possibly content that I did not discover before finalizing the note.

## 2021-11-03 NOTE — ASSESSMENT & PLAN NOTE
Here for completion of DMV form today.  No reported issues with driving.  Not on any medication that would preclude him from driving.  Form completed.

## 2021-11-08 NOTE — PROGRESS NOTES
CC: Follow-up for MANNY on BiPAP        HPI:  Choco Chavarria is a 70 y.o.male  kindly referred by EDITA Recio and last seen on 7/26/2019 when his AHI was normalized to 2.6 and compliance was excellent on bilevel 13/9 cm water..    The patient's last sleep study was performed on 4/24/2019 and showed an AHI of 92.4, a ela saturation of 80%, and saturations less than or equal to 90% for 77.2% of the diagnostic recording.    The [employer] recall discussed. Questions answered. Advised to avoid using unapproved cleaning products such as ozone and avoid high heat and humidity environments which may contribute to noise abatement foam degradation.  Advised to register his machine on the [employer] website which he reportedly has already done.    Today, 11/9/2029, he is 30-day compliance is 83.3% for 7 hours and 31 minutes.  On bilevel 13/9 cm water, his average residual estimated apnea-hypopnea index is 9.6.  The patient reports improved symptoms using positive airway pressure.  Has experienced no significant problems with mask fit, mask leak, pressure tolerance, excessive airway dryness, nasal congestion, aerophagia, or chest pain.    Has had vertigo and nausea. Stopped using his machine and felt better. Now machine is not working at all. Feels poorly 2/2 lack of sleep.      Significant comorbidities and modifying factors include: polyps, dyslipidemia, essential hypertension, history of MI, prediabetes, neuropathy, obesity, former smoker, up-to-date with SARS-CoV-2  vaccination but booster needed, up-to-date with 2021 influenza vaccination, up-to-date with Pneumovax 2017, and status post Shingrix.      Patient Active Problem List    Diagnosis Date Noted   • Encounter for completion of form with patient 11/03/2021   • Dysuria 09/01/2021   • History of colonic polyps 03/28/2019   • Dyslipidemia 06/07/2018   • Essential hypertension 03/09/2018   • Pre-diabetes 03/09/2018   • History of MI  (myocardial infarction) 2018   • MANNY on CPAP 2018   • Neuropathy 2018   • Obesity (BMI 35.0-39.9 without comorbidity) (HCC) 2018       Past Medical History:   Diagnosis Date   • Apnea, sleep    • Chickenpox    • Daytime sleepiness    • Elevated hemoglobin A1c    • Frequent urination    • Gasping for breath    • Heart attack (HCC)    • Hyperlipidemia    • Hypertension    • Influenza    • Mumps    • Obesity    • Ringing in ears    • Sleep apnea    • Snoring    • Wears glasses    • Weight loss         Past Surgical History:   Procedure Laterality Date   • APPENDECTOMY         Family History   Problem Relation Age of Onset   • Cancer Mother    • Lung Disease Mother    • Psychiatric Illness Father        Social History     Socioeconomic History   • Marital status:      Spouse name: Not on file   • Number of children: Not on file   • Years of education: Not on file   • Highest education level: Not on file   Occupational History   • Not on file   Tobacco Use   • Smoking status: Former Smoker     Packs/day: 0.00     Years: 40.00     Pack years: 0.00     Types: Cigarettes     Quit date: 3/9/2016     Years since quittin.6   • Smokeless tobacco: Never Used   Vaping Use   • Vaping Use: Never used   Substance and Sexual Activity   • Alcohol use: Yes     Alcohol/week: 0.6 oz     Types: 1 Cans of beer per week     Comment: rare   • Drug use: No   • Sexual activity: Yes     Partners: Female   Other Topics Concern   • Not on file   Social History Narrative   • Not on file     Social Determinants of Health     Financial Resource Strain:    • Difficulty of Paying Living Expenses: Not on file   Food Insecurity:    • Worried About Running Out of Food in the Last Year: Not on file   • Ran Out of Food in the Last Year: Not on file   Transportation Needs:    • Lack of Transportation (Medical): Not on file   • Lack of Transportation (Non-Medical): Not on file   Physical Activity:    • Days of Exercise per  "Week: Not on file   • Minutes of Exercise per Session: Not on file   Stress:    • Feeling of Stress : Not on file   Social Connections:    • Frequency of Communication with Friends and Family: Not on file   • Frequency of Social Gatherings with Friends and Family: Not on file   • Attends [employer] Services: Not on file   • Active Member of Clubs or Organizations: Not on file   • Attends Club or Organization Meetings: Not on file   • Marital Status: Not on file   Intimate Partner Violence:    • Fear of Current or Ex-Partner: Not on file   • Emotionally Abused: Not on file   • Physically Abused: Not on file   • Sexually Abused: Not on file   Housing Stability:    • Unable to Pay for Housing in the Last Year: Not on file   • Number of Places Lived in the Last Year: Not on file   • Unstable Housing in the Last Year: Not on file       Current Outpatient Medications   Medication Sig Dispense Refill   • lisinopril-hydrochlorothiazide (PRINZIDE) 20-12.5 MG per tablet Take 1 Tablet by mouth every day. 90 Tablet 1   • aspirin EC (ECOTRIN) 81 MG Tablet Delayed Response Take 81 mg by mouth every day.     • B Complex Vitamins (B COMPLEX PO) Take  by mouth.     • Omega-3 Fatty Acids (FISH OIL) 1000 MG Cap capsule Take 1,000 mg by mouth 2 Times a Day.       No current facility-administered medications for this visit.    \"CURRENT RX\"    ALLERGIES: Patient has no known allergies.    ROS    Constitutional: Denies fever, chills, sweats,  weight loss, fatigue  Cardiovascular: Denies chest pain, tightness, palpitations, swelling in legs/feet, fainting, difficulty breathing when lying down but gets better when sitting up.   Respiratory: Denies shortness of breath, cough, sputum, wheezing, painful breathing, coughing up blood.   Sleep: per HPI  Gastrointestinal: Denies  difficulty swallowing, nausea, abdominal pain, diarrhea, constipation, heartburn.  Musculoskeletal: Denies painful joints, sore muscles, back pain.        PHYSICAL " "EXAM      /60 (BP Location: Left arm, Patient Position: Sitting, BP Cuff Size: Adult)   Pulse 90   Resp 16   Ht 1.753 m (5' 9\")   Wt 117 kg (259 lb)   SpO2 94%   BMI 38.25 kg/m²   Appearance: Well-nourished, well-developed, no acute distress  Eyes:  PERRLA, EOMI  ENMT: masked  Neck: Supple, trachea midline, no masses  Respiratory effort:  No intercostal retractions or use of accessory muscles  Lung auscultation:  No wheezes rhonchi rubs or rales  Cardiac: No murmurs, rubs, or gallops; regular rhythm, normal rate; no edema  Abdomen:  No tenderness, no organomegaly.  Musculoskeletal:  Grossly normal; gait and station normal; digits and nails normal  Skin:  No rashes, petechiae, cyanosis  Neurologic: without focal signs; oriented to person, time, place, and purpose; judgement intact  Psychiatric:  No depression, anxiety, agitation      Medical Decision Making       The medical record was reviewed in its entirety including the referral notes, records from primary care, consultants notes, hospital records, lab, imaging, microbiology, immunology, and immunizations.  Care gaps identified and reviewed with the patient.    Diagnostic and titration nocturnal polysomnogram's, home sleep apnea tests, continuous nocturnal oximetry results, multiple sleep latency tests, and compliance reports reviewed.  1. MANNY on CPAP  - DME Mask and Supplies  - DME Other    2. Pre-diabetes    3. Neuropathy    4. History of MI (myocardial infarction)    5. History of colonic polyps    6. Essential hypertension    7. Dyslipidemia      PLAN:   Today, 11/9/2029, he is 30-day compliance is 83.3% for 7 hours and 31 minutes.  On bilevel 13/9 cm water, his average residual estimated apnea-hypopnea index is 9.6.  The patient reports improved symptoms using positive airway pressure.  Has experienced no significant problems with mask fit, mask leak, pressure tolerance, excessive airway dryness, nasal congestion, aerophagia, or chest pain.    We " will empirically increase his bilevel to 14/10 cm water.    Has been advised to continue to clean equipment frequently, and get new mask and supplies as allowed by insurance and DME. Advised about potential problems including nasal obstruction/stuffiness, mask leak or discomfort , frequent awakenings, chill or dryness of the upper airway, noise, inconvenience, and lack of benefit. Recommend an earlier appointment, if significant treatment barriers develop.    The risks of untreated sleep apnea were discussed with the patient at length. Patients with MANNY are at increased risk of cardiovascular disease including coronary artery disease, systemic arterial hypertension, pulmonary arterial hypertension, cardiac arrythmias, and stroke. MANNY patients have an increased risk of motor vehicle accidents, type 2 diabetes, chronic kidney disease, and non-alcoholic liver disease. The patient was advised to avoid driving a motor vehicle when drowsy.    Positive airway pressure will favorably impact many of the adverse conditions and effects provoked by MANNY.    Have advised the patient to follow up with the appropriate healthcare practitioners for all other medical problems and issues.    Mask wearing, handwashing, and social distancing protocols reviewed and encouraged.    Return in about 1 year (around 11/9/2022).      Has had vertigo and nausea. Stopped using his machine and felt better. Now machine is not working at all. Feels poorly 2/2 lack of sleep.    Will order him a new machine as juno inoperative.   Will increase to 14/10 given elevated ahi.    30 minutes total time.

## 2021-11-09 ENCOUNTER — OFFICE VISIT (OUTPATIENT)
Dept: SLEEP MEDICINE | Facility: MEDICAL CENTER | Age: 71
End: 2021-11-09
Payer: MEDICARE

## 2021-11-09 VITALS
OXYGEN SATURATION: 94 % | SYSTOLIC BLOOD PRESSURE: 136 MMHG | WEIGHT: 259 LBS | BODY MASS INDEX: 38.36 KG/M2 | RESPIRATION RATE: 16 BRPM | DIASTOLIC BLOOD PRESSURE: 60 MMHG | HEIGHT: 69 IN | HEART RATE: 90 BPM

## 2021-11-09 DIAGNOSIS — Z86.010 HISTORY OF COLONIC POLYPS: ICD-10-CM

## 2021-11-09 DIAGNOSIS — I10 ESSENTIAL HYPERTENSION: ICD-10-CM

## 2021-11-09 DIAGNOSIS — E78.5 DYSLIPIDEMIA: ICD-10-CM

## 2021-11-09 DIAGNOSIS — G62.9 NEUROPATHY: ICD-10-CM

## 2021-11-09 DIAGNOSIS — I25.2 HISTORY OF MI (MYOCARDIAL INFARCTION): ICD-10-CM

## 2021-11-09 DIAGNOSIS — R73.03 PRE-DIABETES: ICD-10-CM

## 2021-11-09 DIAGNOSIS — G47.33 OSA ON CPAP: ICD-10-CM

## 2021-11-09 PROCEDURE — 99214 OFFICE O/P EST MOD 30 MIN: CPT | Performed by: INTERNAL MEDICINE

## 2021-11-09 ASSESSMENT — FIBROSIS 4 INDEX: FIB4 SCORE: 0.95

## 2021-11-30 ENCOUNTER — OFFICE VISIT (OUTPATIENT)
Dept: CARDIOLOGY | Facility: MEDICAL CENTER | Age: 71
End: 2021-11-30
Payer: MEDICARE

## 2021-11-30 VITALS
OXYGEN SATURATION: 93 % | SYSTOLIC BLOOD PRESSURE: 118 MMHG | HEIGHT: 69 IN | HEART RATE: 71 BPM | BODY MASS INDEX: 37.77 KG/M2 | WEIGHT: 255 LBS | DIASTOLIC BLOOD PRESSURE: 68 MMHG | RESPIRATION RATE: 16 BRPM

## 2021-11-30 DIAGNOSIS — I10 HTN (HYPERTENSION), MALIGNANT: ICD-10-CM

## 2021-11-30 DIAGNOSIS — R94.31 NONSPECIFIC ABNORMAL ELECTROCARDIOGRAM (ECG) (EKG): ICD-10-CM

## 2021-11-30 DIAGNOSIS — E78.5 DYSLIPIDEMIA: ICD-10-CM

## 2021-11-30 PROCEDURE — 99214 OFFICE O/P EST MOD 30 MIN: CPT | Performed by: INTERNAL MEDICINE

## 2021-11-30 RX ORDER — ROSUVASTATIN CALCIUM 5 MG/1
5 TABLET, COATED ORAL EVERY EVENING
Qty: 30 TABLET | Refills: 11 | Status: SHIPPED
Start: 2021-11-30 | End: 2022-04-04

## 2021-11-30 RX ORDER — LISINOPRIL 20 MG/1
20 TABLET ORAL DAILY
Qty: 90 TABLET | Refills: 3 | Status: SHIPPED | OUTPATIENT
Start: 2021-11-30 | End: 2022-07-29 | Stop reason: SDUPTHER

## 2021-11-30 ASSESSMENT — ENCOUNTER SYMPTOMS
SENSORY CHANGE: 0
LOSS OF CONSCIOUSNESS: 0
COUGH: 0
HEADACHES: 0
DIZZINESS: 0
ORTHOPNEA: 0
FEVER: 0
DOUBLE VISION: 0
FALLS: 0
EYE PAIN: 0
CHILLS: 0
DEPRESSION: 0
BLURRED VISION: 0
WEIGHT LOSS: 0
BLOOD IN STOOL: 0
SHORTNESS OF BREATH: 0
PALPITATIONS: 0
EYE DISCHARGE: 0
CLAUDICATION: 0
MYALGIAS: 0
HALLUCINATIONS: 0
BRUISES/BLEEDS EASILY: 0
SPEECH CHANGE: 0
ABDOMINAL PAIN: 0
PND: 0
NAUSEA: 0
VOMITING: 0

## 2021-11-30 ASSESSMENT — FIBROSIS 4 INDEX: FIB4 SCORE: 0.95

## 2021-11-30 NOTE — PROGRESS NOTES
Chief Complaint   Patient presents with   • Hypertension     F/V Dx: Essential hypertension       Subjective     Choco Chavarria is a 70 y.o. male who presents today for cardiac care and management due to prior history of abnormal EKG, hypertension.    Patient was also concerned about continuing clopidogrel medical therapy which was started by his primary care physician. He was told that he might have had an old heart attack based on EKG finding. However, at that time, he did not have any invasive coronary angiogram or cardiac catheterization. He just moved to the area 5 years ago and would like to establish with cardiologist.    I have personally interpreted EKG today with patient, there is no evidence of acute coronary syndrome, no evidence of prior infarct, normal WV and QT interval, no significant conduction disease. Sinus rhythm.    I have independently interpreted and reviewed echocardiogram's actual images with patient which showed normal left ventricular systolic function. No wall motion abnormality. No evidence of pulmonary hypertension. No significant valvular disease.    Stress test was cancelled.    No family history of sudden cardiac death.    Past Medical History:   Diagnosis Date   • Apnea, sleep    • Chickenpox    • Daytime sleepiness    • Elevated hemoglobin A1c    • Frequent urination    • Gasping for breath    • Heart attack (HCC)    • Hyperlipidemia    • Hypertension    • Influenza    • Mumps    • Obesity    • Ringing in ears    • Sleep apnea    • Snoring    • Wears glasses    • Weight loss      Past Surgical History:   Procedure Laterality Date   • APPENDECTOMY       Family History   Problem Relation Age of Onset   • Cancer Mother    • Lung Disease Mother    • Psychiatric Illness Father      Social History     Socioeconomic History   • Marital status:      Spouse name: Not on file   • Number of children: Not on file   • Years of education: Not on file   • Highest education level: Not  on file   Occupational History   • Not on file   Tobacco Use   • Smoking status: Former Smoker     Packs/day: 0.00     Years: 40.00     Pack years: 0.00     Types: Cigarettes     Quit date: 3/9/2016     Years since quittin.7   • Smokeless tobacco: Never Used   Vaping Use   • Vaping Use: Never used   Substance and Sexual Activity   • Alcohol use: Yes     Alcohol/week: 0.6 oz     Types: 1 Cans of beer per week     Comment: rare   • Drug use: No   • Sexual activity: Yes     Partners: Female   Other Topics Concern   • Not on file   Social History Narrative   • Not on file     Social Determinants of Health     Financial Resource Strain:    • Difficulty of Paying Living Expenses: Not on file   Food Insecurity:    • Worried About Running Out of Food in the Last Year: Not on file   • Ran Out of Food in the Last Year: Not on file   Transportation Needs:    • Lack of Transportation (Medical): Not on file   • Lack of Transportation (Non-Medical): Not on file   Physical Activity:    • Days of Exercise per Week: Not on file   • Minutes of Exercise per Session: Not on file   Stress:    • Feeling of Stress : Not on file   Social Connections:    • Frequency of Communication with Friends and Family: Not on file   • Frequency of Social Gatherings with Friends and Family: Not on file   • Attends Yazidism Services: Not on file   • Active Member of Clubs or Organizations: Not on file   • Attends Club or Organization Meetings: Not on file   • Marital Status: Not on file   Intimate Partner Violence:    • Fear of Current or Ex-Partner: Not on file   • Emotionally Abused: Not on file   • Physically Abused: Not on file   • Sexually Abused: Not on file   Housing Stability:    • Unable to Pay for Housing in the Last Year: Not on file   • Number of Places Lived in the Last Year: Not on file   • Unstable Housing in the Last Year: Not on file     No Known Allergies  Outpatient Encounter Medications as of 2021   Medication Sig Dispense  "Refill   • lisinopril-hydrochlorothiazide (PRINZIDE) 20-12.5 MG per tablet Take 1 Tablet by mouth every day. 90 Tablet 1   • aspirin EC (ECOTRIN) 81 MG Tablet Delayed Response Take 81 mg by mouth every day.     • B Complex Vitamins (B COMPLEX PO) Take  by mouth.     • Omega-3 Fatty Acids (FISH OIL) 1000 MG Cap capsule Take 1,000 mg by mouth 2 Times a Day.       No facility-administered encounter medications on file as of 11/30/2021.     Review of Systems   Constitutional: Negative for chills, fever, malaise/fatigue and weight loss.   HENT: Negative for ear discharge, ear pain, hearing loss and nosebleeds.    Eyes: Negative for blurred vision, double vision, pain and discharge.   Respiratory: Negative for cough and shortness of breath.    Cardiovascular: Negative for chest pain, palpitations, orthopnea, claudication, leg swelling and PND.   Gastrointestinal: Negative for abdominal pain, blood in stool, melena, nausea and vomiting.   Genitourinary: Negative for dysuria and hematuria.   Musculoskeletal: Negative for falls, joint pain and myalgias.   Skin: Negative for itching and rash.   Neurological: Negative for dizziness, sensory change, speech change, loss of consciousness and headaches.   Endo/Heme/Allergies: Negative for environmental allergies. Does not bruise/bleed easily.   Psychiatric/Behavioral: Negative for depression, hallucinations and suicidal ideas.     Objective     /68 (BP Location: Left arm, Patient Position: Sitting, BP Cuff Size: Adult)   Pulse 71   Resp 16   Ht 1.753 m (5' 9\")   Wt 116 kg (255 lb)   SpO2 93%   BMI 37.66 kg/m²     Physical Exam  Vitals and nursing note reviewed.   Constitutional:       General: He is not in acute distress.     Appearance: He is not diaphoretic.   HENT:      Head: Normocephalic and atraumatic.      Right Ear: External ear normal.      Left Ear: External ear normal.   Eyes:      General:         Right eye: No discharge.         Left eye: No discharge. "   Neck:      Thyroid: No thyromegaly.      Vascular: No JVD.   Cardiovascular:      Rate and Rhythm: Normal rate and regular rhythm.      Heart sounds: Normal heart sounds. No murmur heard.  No friction rub. No gallop.    Pulmonary:      Effort: No respiratory distress.      Breath sounds: Normal breath sounds.   Abdominal:      General: Bowel sounds are normal. There is no distension.      Tenderness: There is no abdominal tenderness.   Musculoskeletal:         General: No tenderness.   Skin:     General: Skin is warm and dry.   Neurological:      Mental Status: He is alert and oriented to person, place, and time.      Cranial Nerves: No cranial nerve deficit.   Psychiatric:         Behavior: Behavior normal.       Assessment & Plan     1. HTN (hypertension), malignant     2. Nonspecific abnormal electrocardiogram (ECG) (EKG)         Medical Decision Making: Today's Assessment/Status/Plan:      At this time, I do not see an absolute indication to continue clopidogrel therapy. Advised patient to stop clopidogrel at this time. Blood pressures well controlled.  Due to frequent urination, we will stop hydrochlorothiazide.  He will be only on lisinopril 20 mg p.o. once a day.    In the meantime, for risk ratification, we will obtain nuclear stress test.    For LDL control, will start Rosuvastatin 5 mg pd qhs.

## 2021-12-06 ENCOUNTER — APPOINTMENT (OUTPATIENT)
Dept: RADIOLOGY | Facility: MEDICAL CENTER | Age: 71
End: 2021-12-06
Attending: INTERNAL MEDICINE
Payer: MEDICARE

## 2021-12-08 ENCOUNTER — HOSPITAL ENCOUNTER (OUTPATIENT)
Dept: RADIOLOGY | Facility: MEDICAL CENTER | Age: 71
End: 2021-12-08
Attending: INTERNAL MEDICINE
Payer: MEDICARE

## 2021-12-08 DIAGNOSIS — I10 HTN (HYPERTENSION), MALIGNANT: ICD-10-CM

## 2021-12-08 DIAGNOSIS — R94.31 NONSPECIFIC ABNORMAL ELECTROCARDIOGRAM (ECG) (EKG): ICD-10-CM

## 2021-12-08 PROCEDURE — A9502 TC99M TETROFOSMIN: HCPCS

## 2021-12-08 NOTE — PROCEDURES
Patient presents to NM suite for cardiac stress test with MPI. Nursing goals identified: knowledge deficit, potential for anxiety r/t stress test, potential for compromised cardiac output. Care plan includes educating patient, reassurance and access to ACLS cart/team. Labs and ECG reviewed. No caffeine and NPO confirmed. Resting images attained and patient prepped for treadmill stress study. Patient reported these symptoms: frequent PVC'S, BIGEMINY, PSVT, DENIED SYMPTOMS. Water and/or caffeinated beverage provided. Symptoms resolved.

## 2021-12-09 ENCOUNTER — TELEPHONE (OUTPATIENT)
Dept: CARDIOLOGY | Facility: MEDICAL CENTER | Age: 71
End: 2021-12-09

## 2021-12-09 PROCEDURE — 93018 CV STRESS TEST I&R ONLY: CPT | Performed by: INTERNAL MEDICINE

## 2021-12-09 PROCEDURE — 78452 HT MUSCLE IMAGE SPECT MULT: CPT | Mod: 26 | Performed by: INTERNAL MEDICINE

## 2021-12-09 NOTE — TELEPHONE ENCOUNTER
Message  Received: Today  ALEXA Miller R.N.  Dear Ashley,     Can you please let Choco Chavarria know that result is not entirely normal and I will see patient SOONER to discuss?     Thank you,   Celestino.                 NM-CARDIAC STRESS TEST      Called pt and informed him. Pt scheduled to see TT tomorrow 12/10.

## 2021-12-09 NOTE — RESULT ENCOUNTER NOTE
Dear Ashley,    Can you please let Choco Chavarria know that result is not entirely normal and I will see patient SOONER to discuss?    Thank you,  Celestino.

## 2021-12-09 NOTE — RESULT ENCOUNTER NOTE
Dear Ashley,    Can you please let Choco Chavarria know that result is ok and I will see patient as scheduled?    Thank you,  Celestino.

## 2021-12-10 ENCOUNTER — OFFICE VISIT (OUTPATIENT)
Dept: CARDIOLOGY | Facility: MEDICAL CENTER | Age: 71
End: 2021-12-10
Payer: MEDICARE

## 2021-12-10 VITALS
DIASTOLIC BLOOD PRESSURE: 80 MMHG | RESPIRATION RATE: 14 BRPM | BODY MASS INDEX: 38.65 KG/M2 | HEART RATE: 66 BPM | SYSTOLIC BLOOD PRESSURE: 132 MMHG | OXYGEN SATURATION: 94 % | WEIGHT: 255 LBS | HEIGHT: 68 IN

## 2021-12-10 DIAGNOSIS — R94.31 NONSPECIFIC ABNORMAL ELECTROCARDIOGRAM (ECG) (EKG): ICD-10-CM

## 2021-12-10 DIAGNOSIS — I49.3 FREQUENT PVCS: ICD-10-CM

## 2021-12-10 DIAGNOSIS — E78.5 DYSLIPIDEMIA: ICD-10-CM

## 2021-12-10 DIAGNOSIS — I10 HTN (HYPERTENSION), MALIGNANT: ICD-10-CM

## 2021-12-10 PROCEDURE — 99214 OFFICE O/P EST MOD 30 MIN: CPT | Performed by: INTERNAL MEDICINE

## 2021-12-10 ASSESSMENT — ENCOUNTER SYMPTOMS
ABDOMINAL PAIN: 0
CHILLS: 0
DOUBLE VISION: 0
CLAUDICATION: 0
SENSORY CHANGE: 0
HALLUCINATIONS: 0
DEPRESSION: 0
EYE DISCHARGE: 0
HEADACHES: 0
PND: 0
EYE PAIN: 0
VOMITING: 0
PALPITATIONS: 0
BLOOD IN STOOL: 0
COUGH: 0
BLURRED VISION: 0
FALLS: 0
MYALGIAS: 0
DIZZINESS: 0
WEIGHT LOSS: 0
LOSS OF CONSCIOUSNESS: 0
NAUSEA: 0
BRUISES/BLEEDS EASILY: 0
SHORTNESS OF BREATH: 0
SPEECH CHANGE: 0
FEVER: 0
ORTHOPNEA: 0

## 2021-12-10 ASSESSMENT — FIBROSIS 4 INDEX: FIB4 SCORE: 0.96

## 2021-12-10 NOTE — PROGRESS NOTES
Chief Complaint   Patient presents with   • Hypertension     F/V Dx: HTN (hypertension), malignant       Subjective     Choco Chavarria is a 71 y.o. male who presents today for cardiac care and management due to prior history of abnormal EKG, hypertension.    Patient was also concerned about continuing clopidogrel medical therapy which was started by his primary care physician. He was told that he might have had an old heart attack based on EKG finding. However, at that time, he did not have any invasive coronary angiogram or cardiac catheterization. He just moved to the area 5 years ago and would like to establish with cardiologist.    I have personally interpreted EKG today with patient, there is no evidence of acute coronary syndrome, no evidence of prior infarct, normal [place] and QT interval, no significant conduction disease. Sinus rhythm.    I have independently interpreted and reviewed echocardiogram's actual images with patient which showed normal left ventricular systolic function. No wall motion abnormality. No evidence of pulmonary hypertension. No significant valvular disease.    No family history of sudden cardiac death.    Patient is very concerned about side effect of statin therefore he did not start rosuvastatin at this time.  He would try to reduce his LDL with diet and lifestyle changes.    He did have a nuclear stress test which show no significant coronary ischemia.  However, there was evidence of frequent PVCs.      Past Medical History:   Diagnosis Date   • Apnea, sleep    • Chickenpox    • Daytime sleepiness    • Elevated hemoglobin A1c    • Frequent urination    • Gasping for breath    • Heart attack (HCC)    • Hyperlipidemia    • Hypertension    • Influenza    • Mumps    • Obesity    • Ringing in ears    • Sleep apnea    • Snoring    • Wears glasses    • Weight loss      Past Surgical History:   Procedure Laterality Date   • APPENDECTOMY       Family History   Problem Relation Age of Onset    • Cancer Mother    • Lung Disease Mother    • Psychiatric Illness Father      Social History     Socioeconomic History   • Marital status:      Spouse name: Not on file   • Number of children: Not on file   • Years of education: Not on file   • Highest education level: Not on file   Occupational History   • Not on file   Tobacco Use   • Smoking status: Former Smoker     Packs/day: 0.00     Years: 40.00     Pack years: 0.00     Types: Cigarettes     Quit date: 3/9/2016     Years since quittin.7   • Smokeless tobacco: Never Used   Vaping Use   • Vaping Use: Never used   Substance and Sexual Activity   • Alcohol use: Yes     Alcohol/week: 0.6 oz     Types: 1 Cans of beer per week     Comment: rare   • Drug use: No   • Sexual activity: Yes     Partners: Female   Other Topics Concern   • Not on file   Social History Narrative   • Not on file     Social Determinants of Health     Financial Resource Strain:    • Difficulty of Paying Living Expenses: Not on file   Food Insecurity:    • Worried About Running Out of Food in the Last Year: Not on file   • Ran Out of Food in the Last Year: Not on file   Transportation Needs:    • Lack of Transportation (Medical): Not on file   • Lack of Transportation (Non-Medical): Not on file   Physical Activity:    • Days of Exercise per Week: Not on file   • Minutes of Exercise per Session: Not on file   Stress:    • Feeling of Stress : Not on file   Social Connections:    • Frequency of Communication with Friends and Family: Not on file   • Frequency of Social Gatherings with Friends and Family: Not on file   • Attends Spiritism Services: Not on file   • Active Member of Clubs or Organizations: Not on file   • Attends Club or Organization Meetings: Not on file   • Marital Status: Not on file   Intimate Partner Violence:    • Fear of Current or Ex-Partner: Not on file   • Emotionally Abused: Not on file   • Physically Abused: Not on file   • Sexually Abused: Not on file    Housing Stability:    • Unable to Pay for Housing in the Last Year: Not on file   • Number of Places Lived in the Last Year: Not on file   • Unstable Housing in the Last Year: Not on file     Allergies   Allergen Reactions   • Atorvastatin      Outpatient Encounter Medications as of 12/10/2021   Medication Sig Dispense Refill   • lisinopril (PRINIVIL) 20 MG Tab Take 1 Tablet by mouth every day. 90 Tablet 3   • aspirin EC (ECOTRIN) 81 MG Tablet Delayed Response Take 81 mg by mouth every day.     • B Complex Vitamins (B COMPLEX PO) Take  by mouth.     • Omega-3 Fatty Acids (FISH OIL) 1000 MG Cap capsule Take 1,000 mg by mouth 2 Times a Day.     • rosuvastatin (CRESTOR) 5 MG Tab Take 1 Tablet by mouth every evening. (Patient not taking: Reported on 12/10/2021) 30 Tablet 11     No facility-administered encounter medications on file as of 12/10/2021.     Review of Systems   Constitutional: Negative for chills, fever, malaise/fatigue and weight loss.   HENT: Negative for ear discharge, ear pain, hearing loss and nosebleeds.    Eyes: Negative for blurred vision, double vision, pain and discharge.   Respiratory: Negative for cough and shortness of breath.    Cardiovascular: Negative for chest pain, palpitations, orthopnea, claudication, leg swelling and PND.   Gastrointestinal: Negative for abdominal pain, blood in stool, melena, nausea and vomiting.   Genitourinary: Negative for dysuria and hematuria.   Musculoskeletal: Negative for falls, joint pain and myalgias.   Skin: Negative for itching and rash.   Neurological: Negative for dizziness, sensory change, speech change, loss of consciousness and headaches.   Endo/Heme/Allergies: Negative for environmental allergies. Does not bruise/bleed easily.   Psychiatric/Behavioral: Negative for depression, hallucinations and suicidal ideas.     Objective     /80 (BP Location: Left arm, Patient Position: Sitting, BP Cuff Size: Adult)   Pulse 66   Resp 14   Ht 1.727 m (5'  "8\")   Wt 116 kg (255 lb)   SpO2 94%   BMI 38.77 kg/m²     Physical Exam  Vitals and nursing note reviewed.   Constitutional:       General: He is not in acute distress.     Appearance: He is not diaphoretic.   HENT:      Head: Normocephalic and atraumatic.      Right Ear: External ear normal.      Left Ear: External ear normal.   Eyes:      General:         Right eye: No discharge.         Left eye: No discharge.   Neck:      Thyroid: No thyromegaly.      Vascular: No JVD.   Cardiovascular:      Rate and Rhythm: Normal rate and regular rhythm.      Heart sounds: Normal heart sounds. No murmur heard.  No friction rub. No gallop.    Pulmonary:      Effort: No respiratory distress.      Breath sounds: Normal breath sounds.   Abdominal:      General: Bowel sounds are normal. There is no distension.      Tenderness: There is no abdominal tenderness.   Musculoskeletal:         General: No tenderness.   Skin:     General: Skin is warm and dry.   Neurological:      Mental Status: He is alert and oriented to person, place, and time.      Cranial Nerves: No cranial nerve deficit.   Psychiatric:         Behavior: Behavior normal.       Assessment & Plan     1. HTN (hypertension), malignant     2. Nonspecific abnormal electrocardiogram (ECG) (EKG)     3. Dyslipidemia  Basic Metabolic Panel    LIPID PANEL   4. Frequent PVCs         Medical Decision Making: Today's Assessment/Status/Plan:   At this time, I do not see an absolute indication to continue clopidogrel therapy. Advised patient to stop clopidogrel at this time. Blood pressures well controlled.  Due to frequent urination, we will stop hydrochlorothiazide.  He will be only on lisinopril 20 mg p.o. once a day.    Patient is very concerned about side effect of statin therefore he did not start rosuvastatin at this time.  He would try to reduce his LDL with diet and lifestyle changes.  I explained to him the benefits and side effects of any type of medication in terms of " cardiovascular risk factors.  Patient understands.    For LDL control, will start Rosuvastatin 5 mg pd qhs.    In terms of frequent PVCs seen on the nuclear stress test, certainly we could pursue ischemic work-up for further evaluation with an invasive coronary angiogram.  However patient is completely asymptomatic he does not have any chest pain.  And overall, patient appears to be favoring noninvasive meeting conservative medical management rather than invasive work-up.  Therefore, I do think that it would be okay for us to continue monitor clinically without having any more procedure done.

## 2021-12-28 ENCOUNTER — TELEPHONE (OUTPATIENT)
Dept: SLEEP MEDICINE | Facility: MEDICAL CENTER | Age: 71
End: 2021-12-28

## 2021-12-28 NOTE — TELEPHONE ENCOUNTER
----- Message from Yaritza Mckee sent at 12/28/2021 11:20 AM PST -----  Regarding: FW: Bi-Pap machine replacement    ----- Message -----  From: Choco Chavarria  Sent: 12/28/2021  11:04 AM PST  To: [facility]  Subject: Bi-Pap machine replacement                       Topic: Send a Compliment.    I saw Dr Yu on Nov. 9, 2021. He provided  a new prescription for a Bi-Pap machine. His assistant was supposed to check on my Medicare/Blue Cross Blue Shield insurance and get back to me on what my options were regarding a BiPap replacement machine because my Bi-Pap machine stopped working.  I have heard nothing! I getting by on my old C-Pap machine that is 8 years old ans appears to be giving me signs it may stop working soon  I understand Medicare won't pay for another Bi-Pap machine unless mine is 5 years old. Well, it is only 3 years old and has stopped working! I NEED A REPLACEMENT NOW!!!    PLEASE CONTACT ME NOW AND LET ME KNOW WHAT MY OPTIONS ARE!!!    Thank you    Choco Chavarria  [phone]

## 2022-06-02 ENCOUNTER — TELEPHONE (OUTPATIENT)
Dept: CARDIOLOGY | Facility: MEDICAL CENTER | Age: 72
End: 2022-06-02
Payer: MEDICARE

## 2022-06-02 NOTE — TELEPHONE ENCOUNTER
Chart Prep      Spoke to pt regarding standing lab work and he stated that he will get it done before his appt. Pt was reminded on their appt date and time.

## 2022-06-05 ENCOUNTER — PATIENT MESSAGE (OUTPATIENT)
Dept: CARDIOLOGY | Facility: MEDICAL CENTER | Age: 72
End: 2022-06-05
Payer: MEDICARE

## 2022-07-27 ENCOUNTER — HOSPITAL ENCOUNTER (OUTPATIENT)
Dept: LAB | Facility: MEDICAL CENTER | Age: 72
End: 2022-07-27
Attending: INTERNAL MEDICINE
Payer: MEDICARE

## 2022-07-27 DIAGNOSIS — E78.5 DYSLIPIDEMIA: ICD-10-CM

## 2022-07-27 LAB
ANION GAP SERPL CALC-SCNC: 12 MMOL/L (ref 7–16)
BUN SERPL-MCNC: 17 MG/DL (ref 8–22)
CALCIUM SERPL-MCNC: 9.5 MG/DL (ref 8.5–10.5)
CHLORIDE SERPL-SCNC: 104 MMOL/L (ref 96–112)
CHOLEST SERPL-MCNC: 188 MG/DL (ref 100–199)
CO2 SERPL-SCNC: 22 MMOL/L (ref 20–33)
CREAT SERPL-MCNC: 0.87 MG/DL (ref 0.5–1.4)
FASTING STATUS PATIENT QL REPORTED: NORMAL
GFR SERPLBLD CREATININE-BSD FMLA CKD-EPI: 92 ML/MIN/1.73 M 2
GLUCOSE SERPL-MCNC: 84 MG/DL (ref 65–99)
HDLC SERPL-MCNC: 34 MG/DL
LDLC SERPL CALC-MCNC: 114 MG/DL
POTASSIUM SERPL-SCNC: 4.3 MMOL/L (ref 3.6–5.5)
SODIUM SERPL-SCNC: 138 MMOL/L (ref 135–145)
TRIGL SERPL-MCNC: 199 MG/DL (ref 0–149)

## 2022-07-27 PROCEDURE — 80061 LIPID PANEL: CPT

## 2022-07-27 PROCEDURE — 80048 BASIC METABOLIC PNL TOTAL CA: CPT

## 2022-07-27 PROCEDURE — 36415 COLL VENOUS BLD VENIPUNCTURE: CPT

## 2022-07-29 ENCOUNTER — OFFICE VISIT (OUTPATIENT)
Dept: CARDIOLOGY | Facility: MEDICAL CENTER | Age: 72
End: 2022-07-29
Payer: MEDICARE

## 2022-07-29 VITALS
HEIGHT: 69 IN | SYSTOLIC BLOOD PRESSURE: 124 MMHG | OXYGEN SATURATION: 95 % | HEART RATE: 69 BPM | BODY MASS INDEX: 37.62 KG/M2 | WEIGHT: 254 LBS | RESPIRATION RATE: 18 BRPM | DIASTOLIC BLOOD PRESSURE: 70 MMHG

## 2022-07-29 DIAGNOSIS — E78.5 DYSLIPIDEMIA: ICD-10-CM

## 2022-07-29 DIAGNOSIS — I10 HTN (HYPERTENSION), MALIGNANT: ICD-10-CM

## 2022-07-29 DIAGNOSIS — R94.31 NONSPECIFIC ABNORMAL ELECTROCARDIOGRAM (ECG) (EKG): ICD-10-CM

## 2022-07-29 DIAGNOSIS — I49.3 FREQUENT PVCS: ICD-10-CM

## 2022-07-29 PROCEDURE — 99214 OFFICE O/P EST MOD 30 MIN: CPT | Performed by: INTERNAL MEDICINE

## 2022-07-29 RX ORDER — ROSUVASTATIN CALCIUM 5 MG/1
5 TABLET, COATED ORAL EVERY EVENING
Qty: 90 TABLET | Refills: 3 | Status: SHIPPED | OUTPATIENT
Start: 2022-07-29 | End: 2023-12-29

## 2022-07-29 RX ORDER — LISINOPRIL 20 MG/1
20 TABLET ORAL DAILY
Qty: 90 TABLET | Refills: 3 | Status: SHIPPED | OUTPATIENT
Start: 2022-07-29 | End: 2022-09-07 | Stop reason: SDUPTHER

## 2022-07-29 ASSESSMENT — ENCOUNTER SYMPTOMS
CLAUDICATION: 0
EYE DISCHARGE: 0
COUGH: 0
BLURRED VISION: 0
FEVER: 0
LOSS OF CONSCIOUSNESS: 0
EYE PAIN: 0
BLOOD IN STOOL: 0
PND: 0
WEIGHT LOSS: 0
HEADACHES: 0
FALLS: 0
DIZZINESS: 0
DOUBLE VISION: 0
SENSORY CHANGE: 0
DEPRESSION: 0
MYALGIAS: 0
BRUISES/BLEEDS EASILY: 0
NAUSEA: 0
PALPITATIONS: 0
ORTHOPNEA: 0
VOMITING: 0
SPEECH CHANGE: 0
HALLUCINATIONS: 0
SHORTNESS OF BREATH: 0
CHILLS: 0
ABDOMINAL PAIN: 0

## 2022-07-29 ASSESSMENT — FIBROSIS 4 INDEX: FIB4 SCORE: 0.96

## 2022-07-29 NOTE — PROGRESS NOTES
Chief Complaint   Patient presents with   • HTN (Controlled)     F/V Dx: HTN (hypertension), malignant       Subjective     Choco Chavarria is a 71 y.o. male who presents today for cardiac care and management due to prior history of abnormal EKG, hypertension.    Patient was also concerned about continuing clopidogrel medical therapy which was started by his primary care physician. He was told that he might have had an old heart attack based on EKG finding. However, at that time, he did not have any invasive coronary angiogram or cardiac catheterization. He just moved to the area 5 years ago and would like to establish with cardiologist.    I have personally interpreted EKG today with patient, there is no evidence of acute coronary syndrome, no evidence of prior infarct, normal [place] and QT interval, no significant conduction disease. Sinus rhythm.    I have independently interpreted and reviewed echocardiogram's actual images with patient which showed normal left ventricular systolic function. No wall motion abnormality. No evidence of pulmonary hypertension. No significant valvular disease.    No family history of sudden cardiac death.    I have independently interpreted and reviewed blood tests results with patient in clinic which shows elevated LDL level 114, triglycerides 199, normal renal and liver function. K of 4.3.    He did have a nuclear stress test which show no significant coronary ischemia.  However, there was evidence of frequent PVCs.      Past Medical History:   Diagnosis Date   • Apnea, sleep    • Chickenpox    • Daytime sleepiness    • Elevated hemoglobin A1c    • Frequent urination    • Gasping for breath    • Heart attack (HCC)    • Hyperlipidemia    • Hypertension    • Influenza    • Mumps    • Obesity    • Ringing in ears    • Sleep apnea    • Snoring    • Wears glasses    • Weight loss      Past Surgical History:   Procedure Laterality Date   • APPENDECTOMY       Family History   Problem  Relation Age of Onset   • Cancer Mother    • Lung Disease Mother    • Psychiatric Illness Father      Social History     Socioeconomic History   • Marital status:      Spouse name: Not on file   • Number of children: Not on file   • Years of education: Not on file   • Highest education level: Not on file   Occupational History   • Not on file   Tobacco Use   • Smoking status: Former Smoker     Packs/day: 0.00     Years: 40.00     Pack years: 0.00     Types: Cigarettes     Quit date: 3/9/2016     Years since quittin.3   • Smokeless tobacco: Never Used   Vaping Use   • Vaping Use: Never used   Substance and Sexual Activity   • Alcohol use: Yes     Alcohol/week: 0.6 oz     Types: 1 Cans of beer per week     Comment: rare, LESS THAN WEEKLY   • Drug use: No   • Sexual activity: Yes     Partners: Female   Other Topics Concern   • Not on file   Social History Narrative   • Not on file     Social Determinants of Health     Financial Resource Strain: Not on file   Food Insecurity: Not on file   Transportation Needs: Not on file   Physical Activity: Not on file   Stress: Not on file   Social Connections: Not on file   Intimate Partner Violence: Not on file   Housing Stability: Not on file     No Known Allergies  Outpatient Encounter Medications as of 2022   Medication Sig Dispense Refill   • Misc Natural Products (ENERGY FOCUS PO) every day. FOCUS FACTOR     • lisinopril (PRINIVIL) 20 MG Tab Take 1 Tablet by mouth every day. 90 Tablet 3   • aspirin EC (ECOTRIN) 81 MG Tablet Delayed Response Take 81 mg by mouth every day.     • B Complex Vitamins (B COMPLEX PO) Take  by mouth.     • Omega-3 Fatty Acids (FISH OIL) 1000 MG Cap capsule Take 1,000 mg by mouth 2 Times a Day.       No facility-administered encounter medications on file as of 2022.     Review of Systems   Constitutional: Negative for chills, fever, malaise/fatigue and weight loss.   HENT: Negative for ear discharge, ear pain, hearing loss and  "nosebleeds.    Eyes: Negative for blurred vision, double vision, pain and discharge.   Respiratory: Negative for cough and shortness of breath.    Cardiovascular: Negative for chest pain, palpitations, orthopnea, claudication, leg swelling and PND.   Gastrointestinal: Negative for abdominal pain, blood in stool, melena, nausea and vomiting.   Genitourinary: Negative for dysuria and hematuria.   Musculoskeletal: Negative for falls, joint pain and myalgias.   Skin: Negative for itching and rash.   Neurological: Negative for dizziness, sensory change, speech change, loss of consciousness and headaches.   Endo/Heme/Allergies: Negative for environmental allergies. Does not bruise/bleed easily.   Psychiatric/Behavioral: Negative for depression, hallucinations and suicidal ideas.     Objective     BP (!) 0/0 (BP Location: Left arm, Patient Position: Sitting, BP Cuff Size: Adult)   Ht 1.753 m (5' 9\")   BMI 36.18 kg/m²     Physical Exam  Vitals and nursing note reviewed.   Constitutional:       General: He is not in acute distress.     Appearance: He is not diaphoretic.   HENT:      Head: Normocephalic and atraumatic.      Right Ear: External ear normal.      Left Ear: External ear normal.   Eyes:      General:         Right eye: No discharge.         Left eye: No discharge.   Neck:      Thyroid: No thyromegaly.      Vascular: No JVD.   Cardiovascular:      Rate and Rhythm: Normal rate and regular rhythm.      Heart sounds: Normal heart sounds. No murmur heard.    No friction rub. No gallop.   Pulmonary:      Effort: No respiratory distress.      Breath sounds: Normal breath sounds.   Abdominal:      General: Bowel sounds are normal. There is no distension.      Tenderness: There is no abdominal tenderness.   Musculoskeletal:         General: No tenderness.   Skin:     General: Skin is warm and dry.   Neurological:      Mental Status: He is alert and oriented to person, place, and time.      Cranial Nerves: No cranial " nerve deficit.   Psychiatric:         Behavior: Behavior normal.       Assessment & Plan     1. HTN (hypertension), malignant     2. Dyslipidemia     3. Frequent PVCs     4. Nonspecific abnormal electrocardiogram (ECG) (EKG)         Medical Decision Making: Today's Assessment/Status/Plan:   Blood pressure is well controlled.    Conitnue lisinopril 20 mg p.o. once a day.    For LDL control, will start Rosuvastatin 5 mg pd qhs.

## 2022-10-12 ENCOUNTER — PHARMACY VISIT (OUTPATIENT)
Dept: PHARMACY | Facility: MEDICAL CENTER | Age: 72
End: 2022-10-12
Payer: COMMERCIAL

## 2022-10-12 PROCEDURE — RXMED WILLOW AMBULATORY MEDICATION CHARGE: Performed by: INTERNAL MEDICINE

## 2022-11-02 ENCOUNTER — PATIENT MESSAGE (OUTPATIENT)
Dept: HEALTH INFORMATION MANAGEMENT | Facility: OTHER | Age: 72
End: 2022-11-02

## 2022-11-29 ENCOUNTER — HOSPITAL ENCOUNTER (OUTPATIENT)
Dept: LAB | Facility: MEDICAL CENTER | Age: 72
End: 2022-11-29
Attending: INTERNAL MEDICINE
Payer: MEDICARE

## 2022-11-29 DIAGNOSIS — E78.5 DYSLIPIDEMIA: ICD-10-CM

## 2022-11-29 LAB
ANION GAP SERPL CALC-SCNC: 11 MMOL/L (ref 7–16)
BUN SERPL-MCNC: 21 MG/DL (ref 8–22)
CALCIUM SERPL-MCNC: 10.2 MG/DL (ref 8.5–10.5)
CHLORIDE SERPL-SCNC: 105 MMOL/L (ref 96–112)
CHOLEST SERPL-MCNC: 189 MG/DL (ref 100–199)
CO2 SERPL-SCNC: 26 MMOL/L (ref 20–33)
CREAT SERPL-MCNC: 1.06 MG/DL (ref 0.5–1.4)
GFR SERPLBLD CREATININE-BSD FMLA CKD-EPI: 75 ML/MIN/1.73 M 2
GLUCOSE SERPL-MCNC: 92 MG/DL (ref 65–99)
HDLC SERPL-MCNC: 37 MG/DL
LDLC SERPL CALC-MCNC: 98 MG/DL
POTASSIUM SERPL-SCNC: 4.6 MMOL/L (ref 3.6–5.5)
SODIUM SERPL-SCNC: 142 MMOL/L (ref 135–145)
TRIGL SERPL-MCNC: 268 MG/DL (ref 0–149)

## 2022-11-29 PROCEDURE — 80061 LIPID PANEL: CPT

## 2022-11-29 PROCEDURE — 36415 COLL VENOUS BLD VENIPUNCTURE: CPT

## 2022-11-29 PROCEDURE — 80048 BASIC METABOLIC PNL TOTAL CA: CPT

## 2023-11-20 NOTE — TELEPHONE ENCOUNTER
Mailed letter to pt's home     Medication: Albuterol  Last office visit date: 8/23/23  Next appointment scheduled?: Yes   Number of refills given: 3    No protocol for requested medication     Medication: Tizanidine   Last office visit date: 8/23/23  Pharmacy: [id number] - [place], [place] - [address] AT [facility] [address]    [facility], [place] for refill.

## 2023-12-22 ENCOUNTER — PHARMACY VISIT (OUTPATIENT)
Dept: PHARMACY | Facility: MEDICAL CENTER | Age: 73
End: 2023-12-22
Payer: COMMERCIAL

## 2023-12-22 PROCEDURE — RXMED WILLOW AMBULATORY MEDICATION CHARGE: Performed by: INTERNAL MEDICINE

## 2023-12-22 RX ORDER — INFLUENZA A VIRUS A/MICHIGAN/45/2015 X-275 (H1N1) ANTIGEN (FORMALDEHYDE INACTIVATED), INFLUENZA A VIRUS A/SINGAPORE/INFIMH-16-0019/2016 IVR-186 (H3N2) ANTIGEN (FORMALDEHYDE INACTIVATED), INFLUENZA B VIRUS B/PHUKET/3073/2013 ANTIGEN (FORMALDEHYDE INACTIVATED), AND INFLUENZA B VIRUS B/MARYLAND/15/2016 BX-69A ANTIGEN (FORMALDEHYDE INACTIVATED) 60; 60; 60; 60 UG/.7ML; UG/.7ML; UG/.7ML; UG/.7ML
INJECTION, SUSPENSION INTRAMUSCULAR
Qty: 0.7 ML | Refills: 0 | Status: SHIPPED | OUTPATIENT
Start: 2023-12-22 | End: 2023-12-29

## 2023-12-27 DIAGNOSIS — I10 HTN (HYPERTENSION), MALIGNANT: ICD-10-CM

## 2023-12-28 DIAGNOSIS — I10 HTN (HYPERTENSION), MALIGNANT: ICD-10-CM

## 2023-12-28 RX ORDER — LISINOPRIL 20 MG/1
20 TABLET ORAL DAILY
Qty: 90 TABLET | Refills: 3 | OUTPATIENT
Start: 2023-12-28

## 2023-12-28 NOTE — TELEPHONE ENCOUNTER
TT    Caller: Choco Chavarria     Topic/issue: Pt scheduled appt with SC for tomorrow 12/29/2023. He is completely out of his  LISINOPRIL and wondered if it could be sent in since appt has been made.     Callback Number: [phone] (home)      Thank You    Perri AVILA

## 2023-12-28 NOTE — TELEPHONE ENCOUNTER
Is the patient due for a refill? Yes    Was the patient seen the past year? Yes    Date of last office visit: 07/29/2022    Does the patient have an upcoming appointment?  Yes   If yes, When? 12/29/2023    Provider to refill:TT    Does the patients insurance require a 100 day supply?  No

## 2023-12-29 ENCOUNTER — PATIENT MESSAGE (OUTPATIENT)
Dept: CARDIOLOGY | Facility: MEDICAL CENTER | Age: 73
End: 2023-12-29

## 2023-12-29 ENCOUNTER — OFFICE VISIT (OUTPATIENT)
Dept: CARDIOLOGY | Facility: MEDICAL CENTER | Age: 73
End: 2023-12-29
Attending: NURSE PRACTITIONER
Payer: MEDICARE

## 2023-12-29 VITALS
DIASTOLIC BLOOD PRESSURE: 76 MMHG | HEIGHT: 69 IN | BODY MASS INDEX: 39.62 KG/M2 | WEIGHT: 267.5 LBS | HEART RATE: 75 BPM | OXYGEN SATURATION: 91 % | SYSTOLIC BLOOD PRESSURE: 150 MMHG

## 2023-12-29 DIAGNOSIS — R79.9 ABNORMAL FINDING OF BLOOD CHEMISTRY, UNSPECIFIED: ICD-10-CM

## 2023-12-29 DIAGNOSIS — G47.33 OSA TREATED WITH BIPAP: ICD-10-CM

## 2023-12-29 DIAGNOSIS — E78.5 DYSLIPIDEMIA: ICD-10-CM

## 2023-12-29 DIAGNOSIS — E66.9 OBESITY (BMI 35.0-39.9 WITHOUT COMORBIDITY): ICD-10-CM

## 2023-12-29 DIAGNOSIS — I10 HTN (HYPERTENSION), MALIGNANT: ICD-10-CM

## 2023-12-29 DIAGNOSIS — I10 ESSENTIAL HYPERTENSION: ICD-10-CM

## 2023-12-29 PROBLEM — Z02.89 ENCOUNTER FOR COMPLETION OF FORM WITH PATIENT: Status: RESOLVED | Noted: 2021-11-03 | Resolved: 2023-12-29

## 2023-12-29 PROBLEM — R73.03 PRE-DIABETES: Status: RESOLVED | Noted: 2018-03-09 | Resolved: 2023-12-29

## 2023-12-29 PROBLEM — I25.2 HISTORY OF MI (MYOCARDIAL INFARCTION): Status: RESOLVED | Noted: 2018-03-09 | Resolved: 2023-12-29

## 2023-12-29 PROCEDURE — 99212 OFFICE O/P EST SF 10 MIN: CPT | Performed by: NURSE PRACTITIONER

## 2023-12-29 PROCEDURE — 3077F SYST BP >= 140 MM HG: CPT | Performed by: NURSE PRACTITIONER

## 2023-12-29 PROCEDURE — 99214 OFFICE O/P EST MOD 30 MIN: CPT | Performed by: NURSE PRACTITIONER

## 2023-12-29 PROCEDURE — 3078F DIAST BP <80 MM HG: CPT | Performed by: NURSE PRACTITIONER

## 2023-12-29 RX ORDER — LISINOPRIL 20 MG/1
20 TABLET ORAL DAILY
Qty: 100 TABLET | Refills: 3 | Status: SHIPPED | OUTPATIENT
Start: 2023-12-29

## 2023-12-29 RX ORDER — LISINOPRIL 20 MG/1
20 TABLET ORAL DAILY
Qty: 90 TABLET | Refills: 0 | OUTPATIENT
Start: 2023-12-29

## 2023-12-29 ASSESSMENT — ENCOUNTER SYMPTOMS
MYALGIAS: 0
SHORTNESS OF BREATH: 0
DIZZINESS: 0
FEVER: 0
PALPITATIONS: 0
COUGH: 0
ORTHOPNEA: 0
CLAUDICATION: 0
ABDOMINAL PAIN: 0
PND: 0

## 2023-12-29 NOTE — PATIENT INSTRUCTIONS
Obtain labs in 6 months.    Let me know if cholesterol medication is unaffordable.     Follow up with your primary care doctor next year.     Follow up with our office in December.    I will check on your BP readings next week by homer. BP goal <130/80.    Work on weight loss with diet and exercise changes.

## 2023-12-29 NOTE — PROGRESS NOTES
Chief Complaint   Patient presents with    Hypertension    Dyslipidemia     Subjective     Choco Chavarria is a 73 y.o. male who presents today for annual follow up and BP/HLD management.    He is a prior patient of Dr. Burton in our office. Hx of obesity with MANNY on biPAP, HTN, and HLD.    He presents today alone. He is overall doing well. He knows he needs to work on losing weight.    His BP is elevated today as he didn't have his BP medication to take. He needs refills today.    No cardiac symptoms other than lower leg swelling for the last three years.    He has no chest pain, shortness of breath, dizziness/lightheadedness, or palpitations.    Past Medical History:   Diagnosis Date    Apnea, sleep     Chickenpox     Daytime sleepiness     Elevated hemoglobin A1c     Frequent urination     Gasping for breath     Heart attack (HCC)     Hyperlipidemia     Hypertension     Influenza     Mumps     Obesity     Ringing in ears     Sleep apnea     Snoring     Wears glasses     Weight loss      Past Surgical History:   Procedure Laterality Date    APPENDECTOMY       Family History   Problem Relation Age of Onset    Cancer Mother     Lung Disease Mother     Psychiatric Illness Father      Social History     Socioeconomic History    Marital status:      Spouse name: Not on file    Number of children: Not on file    Years of education: Not on file    Highest education level: Not on file   Occupational History    Not on file   Tobacco Use    Smoking status: Former     Current packs/day: 0.00     Types: Cigarettes     Quit date: 3/9/1976     Years since quittin.8    Smokeless tobacco: Never   Vaping Use    Vaping Use: Never used   Substance and Sexual Activity    Alcohol use: Yes     Alcohol/week: 0.6 oz     Types: 1 Cans of beer per week     Comment: rare, LESS THAN WEEKLY    Drug use: No    Sexual activity: Yes     Partners: Female   Other Topics Concern    Not on file   Social History Narrative    Not on file      Social Determinants of Health     Financial Resource Strain: Not on file   Food Insecurity: Not on file   Transportation Needs: Not on file   Physical Activity: Not on file   Stress: Not on file   Social Connections: Not on file   Intimate Partner Violence: Not on file   Housing Stability: Not on file     No Known Allergies  Outpatient Encounter Medications as of 12/29/2023   Medication Sig Dispense Refill    lisinopril (PRINIVIL) 20 MG Tab Take 1 Tablet by mouth every day. 100 Tablet 3    Bempedoic Acid-Ezetimibe 180-10 MG Tab Take 1 Tablet by mouth every evening. 100 Tablet 3    Misc Natural Products (ENERGY FOCUS PO) every day. FOCUS FACTOR      aspirin EC (ECOTRIN) 81 MG Tablet Delayed Response Take 81 mg by mouth every day.      B Complex Vitamins (B COMPLEX PO) Take  by mouth.      Omega-3 Fatty Acids (FISH OIL) 1000 MG Cap capsule Take 1,000 mg by mouth 2 Times a Day.      [DISCONTINUED] influenza Vac High-Dose Quad (FLUZONE HIGH-DOSE QUADRIVALENT) 0.7 ML Suspension Prefilled Syringe injection Inject  into the shoulder, thigh, or buttocks. 0.7 mL 0    [DISCONTINUED] influenza Vac High-Dose Quad (FLUZONE HIGH-DOSE QUADRIVALENT) 0.7 ML Suspension Prefilled Syringe injection Inject  into the shoulder, thigh, or buttocks. 0.7 mL 0    [DISCONTINUED] lisinopril (PRINIVIL) 20 MG Tab Take 1 Tablet by mouth every day. 90 Tablet 3    [DISCONTINUED] rosuvastatin (CRESTOR) 5 MG Tab Take 1 Tablet by mouth every evening. 90 Tablet 3     No facility-administered encounter medications on file as of 12/29/2023.     Review of Systems   Constitutional:  Negative for fever and malaise/fatigue.   Respiratory:  Negative for cough and shortness of breath.    Cardiovascular:  Positive for leg swelling. Negative for chest pain, palpitations, orthopnea, claudication and PND.   Gastrointestinal:  Negative for abdominal pain.   Musculoskeletal:  Negative for myalgias.   Neurological:  Negative for dizziness.              Objective  "    BP (!) 150/76 (BP Location: Left arm, Patient Position: Sitting, BP Cuff Size: Adult)   Pulse 75   Ht 1.753 m (5' 9\")   Wt 121 kg (267 lb 8 oz)   SpO2 91%   BMI 39.50 kg/m²     Physical Exam  Vitals and nursing note reviewed.   Constitutional:       Appearance: Normal appearance. He is well-developed. He is obese.   HENT:      Head: Normocephalic and atraumatic.   Neck:      Vascular: No JVD.   Cardiovascular:      Rate and Rhythm: Normal rate and regular rhythm.      Pulses: Normal pulses.      Heart sounds: Normal heart sounds.   Pulmonary:      Effort: Pulmonary effort is normal.      Breath sounds: Normal breath sounds.   Musculoskeletal:         General: Normal range of motion.      Right lower leg: Edema present.      Left lower leg: Edema present.   Skin:     General: Skin is warm and dry.      Capillary Refill: Capillary refill takes less than 2 seconds.   Neurological:      General: No focal deficit present.      Mental Status: He is alert and oriented to person, place, and time. Mental status is at baseline.   Psychiatric:         Mood and Affect: Mood normal.         Behavior: Behavior normal.         Thought Content: Thought content normal.         Judgment: Judgment normal.                Assessment & Plan     1. Dyslipidemia  Bempedoic Acid-Ezetimibe 180-10 MG Tab    Lipid Profile    Comp Metabolic Panel      2. MANNY treated with BiPAP        3. Essential hypertension        4. Obesity (BMI 35.0-39.9 without comorbidity) (HCC)  CBC WITHOUT DIFFERENTIAL    HEMOGLOBIN A1C (Glycohemoglobin GHB Total/A1C with MBG Estimate)      5. HTN (hypertension), malignant  lisinopril (PRINIVIL) 20 MG Tab      6. Abnormal finding of blood chemistry, unspecified  HEMOGLOBIN A1C (Glycohemoglobin GHB Total/A1C with MBG Estimate)        Medical Decision Making: Today's Assessment/Status/Plan:      1. Obesity with MANNY on BiPAP  -tolerating biPAP  -no rhythm concerns  -work on weight loss, discussed diet/exercise " regimen  -gave handouts for review  -check Hgb1ac and follow up with PCP next year    2. HLD  -untreated  -stopped statins for neuropathic leg pain  -recommend try zetia/bempedoic acid as alternative  -call if unaffordable  -LLD goal <100    3. HTN  -uncontrolled today with no lisinopril  -refilled lisinopril 20 mg QD  -recommend BP goal <130/80  -consider low dose diuretic as secondary with leg swelling  -if not improved by next week, lets add hctz 12.5 mg once daily   -follow  -watch salt in diet    Patient is to follow up with Pinky STOUT in 1 year with labs.

## 2024-01-10 RX ORDER — HYDROCHLOROTHIAZIDE 12.5 MG/1
12.5 CAPSULE, GELATIN COATED ORAL DAILY
Qty: 90 CAPSULE | Refills: 3 | Status: SHIPPED | OUTPATIENT
Start: 2024-01-10

## 2024-01-10 NOTE — PATIENT COMMUNICATION
MARIANA Taylor.  You23 minutes ago (12:25 PM)     Add hcta 12.5 mg QAM. Repeat bmp in 1 month. Follow up with BP and symptoms in 2 weeks over my chart. SC     Recommendations sent to patient via [employer].

## 2024-04-11 ENCOUNTER — HOSPITAL ENCOUNTER (OUTPATIENT)
Dept: LAB | Facility: MEDICAL CENTER | Age: 74
End: 2024-04-11
Attending: NURSE PRACTITIONER
Payer: MEDICARE

## 2024-04-11 DIAGNOSIS — E66.9 OBESITY (BMI 35.0-39.9 WITHOUT COMORBIDITY): ICD-10-CM

## 2024-04-11 DIAGNOSIS — I10 ESSENTIAL HYPERTENSION: ICD-10-CM

## 2024-04-11 DIAGNOSIS — R79.9 ABNORMAL FINDING OF BLOOD CHEMISTRY, UNSPECIFIED: ICD-10-CM

## 2024-04-11 DIAGNOSIS — E78.5 DYSLIPIDEMIA: ICD-10-CM

## 2024-04-11 LAB
ALBUMIN SERPL BCP-MCNC: 4.1 G/DL (ref 3.2–4.9)
ALBUMIN/GLOB SERPL: 1.4 G/DL
ALP SERPL-CCNC: 76 U/L (ref 30–99)
ALT SERPL-CCNC: 28 U/L (ref 2–50)
ANION GAP SERPL CALC-SCNC: 11 MMOL/L (ref 7–16)
AST SERPL-CCNC: 27 U/L (ref 12–45)
BILIRUB SERPL-MCNC: 0.6 MG/DL (ref 0.1–1.5)
BUN SERPL-MCNC: 13 MG/DL (ref 8–22)
CALCIUM ALBUM COR SERPL-MCNC: 9.5 MG/DL (ref 8.5–10.5)
CALCIUM SERPL-MCNC: 9.6 MG/DL (ref 8.5–10.5)
CHLORIDE SERPL-SCNC: 102 MMOL/L (ref 96–112)
CHOLEST SERPL-MCNC: 150 MG/DL (ref 100–199)
CO2 SERPL-SCNC: 24 MMOL/L (ref 20–33)
CREAT SERPL-MCNC: 0.84 MG/DL (ref 0.5–1.4)
ERYTHROCYTE [DISTWIDTH] IN BLOOD BY AUTOMATED COUNT: 55.8 FL (ref 35.9–50)
EST. AVERAGE GLUCOSE BLD GHB EST-MCNC: 108 MG/DL
FASTING STATUS PATIENT QL REPORTED: NORMAL
GFR SERPLBLD CREATININE-BSD FMLA CKD-EPI: 92 ML/MIN/1.73 M 2
GLOBULIN SER CALC-MCNC: 3 G/DL (ref 1.9–3.5)
GLUCOSE SERPL-MCNC: 94 MG/DL (ref 65–99)
HBA1C MFR BLD: 5.4 % (ref 4–5.6)
HCT VFR BLD AUTO: 50.9 % (ref 42–52)
HDLC SERPL-MCNC: 29 MG/DL
HGB BLD-MCNC: 17.2 G/DL (ref 14–18)
LDLC SERPL CALC-MCNC: 86 MG/DL
MCH RBC QN AUTO: 34.1 PG (ref 27–33)
MCHC RBC AUTO-ENTMCNC: 33.8 G/DL (ref 32.3–36.5)
MCV RBC AUTO: 101 FL (ref 81.4–97.8)
PLATELET # BLD AUTO: 200 K/UL (ref 164–446)
PMV BLD AUTO: 11.9 FL (ref 9–12.9)
POTASSIUM SERPL-SCNC: 4.3 MMOL/L (ref 3.6–5.5)
PROT SERPL-MCNC: 7.1 G/DL (ref 6–8.2)
RBC # BLD AUTO: 5.04 M/UL (ref 4.7–6.1)
SODIUM SERPL-SCNC: 137 MMOL/L (ref 135–145)
TRIGL SERPL-MCNC: 175 MG/DL (ref 0–149)
WBC # BLD AUTO: 6.7 K/UL (ref 4.8–10.8)

## 2024-04-11 PROCEDURE — 80053 COMPREHEN METABOLIC PANEL: CPT

## 2024-04-11 PROCEDURE — 83036 HEMOGLOBIN GLYCOSYLATED A1C: CPT | Mod: GA

## 2024-04-11 PROCEDURE — 80061 LIPID PANEL: CPT

## 2024-04-11 PROCEDURE — 36415 COLL VENOUS BLD VENIPUNCTURE: CPT | Mod: GA

## 2024-04-11 PROCEDURE — 85027 COMPLETE CBC AUTOMATED: CPT

## 2024-04-28 ENCOUNTER — OFFICE VISIT (OUTPATIENT)
Dept: URGENT CARE | Facility: PHYSICIAN GROUP | Age: 74
End: 2024-04-28
Payer: MEDICARE

## 2024-04-28 ENCOUNTER — PATIENT MESSAGE (OUTPATIENT)
Dept: CARDIOLOGY | Facility: MEDICAL CENTER | Age: 74
End: 2024-04-28

## 2024-04-28 VITALS
RESPIRATION RATE: 18 BRPM | SYSTOLIC BLOOD PRESSURE: 132 MMHG | HEART RATE: 80 BPM | BODY MASS INDEX: 37.8 KG/M2 | TEMPERATURE: 97.8 F | DIASTOLIC BLOOD PRESSURE: 64 MMHG | OXYGEN SATURATION: 94 % | WEIGHT: 256 LBS

## 2024-04-28 DIAGNOSIS — J32.9 RHINOSINUSITIS: ICD-10-CM

## 2024-04-28 RX ORDER — AMOXICILLIN AND CLAVULANATE POTASSIUM 875; 125 MG/1; MG/1
1 TABLET, FILM COATED ORAL 2 TIMES DAILY
Qty: 14 TABLET | Refills: 0 | Status: SHIPPED | OUTPATIENT
Start: 2024-04-28 | End: 2024-05-05

## 2024-04-28 ASSESSMENT — FIBROSIS 4 INDEX: FIB4 SCORE: 1.86

## 2024-04-29 ENCOUNTER — PATIENT MESSAGE (OUTPATIENT)
Dept: CARDIOLOGY | Facility: MEDICAL CENTER | Age: 74
End: 2024-04-29
Payer: MEDICARE

## 2024-04-30 ASSESSMENT — ENCOUNTER SYMPTOMS
NAUSEA: 0
MYALGIAS: 0
WEIGHT LOSS: 0
EYE REDNESS: 0
VOMITING: 0
EYE DISCHARGE: 0

## 2024-05-01 NOTE — PROGRESS NOTES
Subjective     Choco Chavarria is a 73 y.o. male who presents with Sinusitis (Pt states he caught a cold last week and states that at night when he's sleeping he stops breathing and wakes him up)            1.5 weeks progressively worse sinus pressure and drainage. PMH sinusitis. No fever. ST and cough due to drainage. No relief with OTC medication. No other aggravating or alleviating factors.          Review of Systems   Constitutional:  Negative for malaise/fatigue and weight loss.   Eyes:  Negative for discharge and redness.   Gastrointestinal:  Negative for nausea and vomiting.   Musculoskeletal:  Negative for joint pain and myalgias.   Skin:  Negative for itching and rash.              Objective     /64 (BP Location: Right arm, Patient Position: Sitting, BP Cuff Size: Adult long)   Pulse 80   Temp 36.6 °C (97.8 °F) (Temporal)   Resp 18   Wt 116 kg (256 lb)   SpO2 94%   BMI 37.80 kg/m²      Physical Exam  Constitutional:       General: He is not in acute distress.     Appearance: He is well-developed.   HENT:      Head: Normocephalic and atraumatic.      Right Ear: Tympanic membrane normal.      Left Ear: Tympanic membrane normal.      Nose: Congestion present.      Mouth/Throat:      Comments: Purulent PND  Eyes:      Conjunctiva/sclera: Conjunctivae normal.   Cardiovascular:      Rate and Rhythm: Normal rate and regular rhythm.      Heart sounds: Normal heart sounds. No murmur heard.  Pulmonary:      Effort: Pulmonary effort is normal.      Breath sounds: Normal breath sounds. No wheezing.   Skin:     General: Skin is warm and dry.      Findings: No rash.   Neurological:      Mental Status: He is alert.                             Assessment & Plan        1. Rhinosinusitis    - amoxicillin-clavulanate (AUGMENTIN) 875-125 MG Tab; Take 1 Tablet by mouth 2 times a day for 7 days.  Dispense: 14 Tablet; Refill: 0    Nasal saline. Nasal corticosteroid.     Differential diagnosis, natural history,  supportive care, and indications for immediate follow-up were discussed.

## 2024-05-20 ENCOUNTER — PATIENT MESSAGE (OUTPATIENT)
Dept: CARDIOLOGY | Facility: MEDICAL CENTER | Age: 74
End: 2024-05-20
Payer: MEDICARE

## 2024-05-20 DIAGNOSIS — R09.81 NASAL SINUS CONGESTION: ICD-10-CM

## 2024-05-21 NOTE — PATIENT COMMUNICATION
MARIANA Taylor.  You10 minutes ago (2:16 PM)     Yes ok to place referral. SC     Referral placed and patient notified at this time.

## 2024-06-04 ENCOUNTER — PATIENT MESSAGE (OUTPATIENT)
Dept: CARDIOLOGY | Facility: MEDICAL CENTER | Age: 74
End: 2024-06-04
Payer: MEDICARE

## 2024-06-05 NOTE — PATIENT COMMUNICATION
MARIANA Taylor.  You11 minutes ago (8:10 AM)     Ok for prednisone. Watch BP with starting. If elevates more, we may need to change BP medications. SC     Recommendations sent to patient via [employer]

## 2024-06-18 ENCOUNTER — APPOINTMENT (OUTPATIENT)
Dept: SLEEP MEDICINE | Facility: MEDICAL CENTER | Age: 74
End: 2024-06-18
Attending: PHYSICIAN ASSISTANT
Payer: MEDICARE

## 2024-08-27 ENCOUNTER — HOSPITAL ENCOUNTER (OUTPATIENT)
Dept: LAB | Facility: MEDICAL CENTER | Age: 74
End: 2024-08-27
Attending: NURSE PRACTITIONER
Payer: MEDICARE

## 2024-08-27 PROCEDURE — 84153 ASSAY OF PSA TOTAL: CPT | Mod: GA

## 2024-08-27 PROCEDURE — 36415 COLL VENOUS BLD VENIPUNCTURE: CPT

## 2024-08-27 PROCEDURE — 84154 ASSAY OF PSA FREE: CPT

## 2024-08-29 LAB
PSA FREE MFR SERPL: 13 %
PSA FREE SERPL-MCNC: 0.7 NG/ML
PSA SERPL-MCNC: 5.3 NG/ML (ref 0–4)

## 2024-09-19 ENCOUNTER — OFFICE VISIT (OUTPATIENT)
Dept: CARDIOLOGY | Facility: MEDICAL CENTER | Age: 74
End: 2024-09-19
Attending: INTERNAL MEDICINE
Payer: MEDICARE

## 2024-09-19 VITALS
OXYGEN SATURATION: 92 % | HEIGHT: 69 IN | SYSTOLIC BLOOD PRESSURE: 104 MMHG | DIASTOLIC BLOOD PRESSURE: 66 MMHG | HEART RATE: 70 BPM | RESPIRATION RATE: 16 BRPM | BODY MASS INDEX: 37.92 KG/M2 | WEIGHT: 256 LBS

## 2024-09-19 DIAGNOSIS — E78.2 MIXED HYPERLIPIDEMIA: ICD-10-CM

## 2024-09-19 DIAGNOSIS — E78.1 HYPERTRIGLYCERIDEMIA: ICD-10-CM

## 2024-09-19 DIAGNOSIS — G47.33 OSA TREATED WITH BIPAP: ICD-10-CM

## 2024-09-19 DIAGNOSIS — I10 HTN (HYPERTENSION), MALIGNANT: ICD-10-CM

## 2024-09-19 PROCEDURE — 3074F SYST BP LT 130 MM HG: CPT | Performed by: INTERNAL MEDICINE

## 2024-09-19 PROCEDURE — 99212 OFFICE O/P EST SF 10 MIN: CPT | Performed by: INTERNAL MEDICINE

## 2024-09-19 PROCEDURE — G2211 COMPLEX E/M VISIT ADD ON: HCPCS | Performed by: INTERNAL MEDICINE

## 2024-09-19 PROCEDURE — 3078F DIAST BP <80 MM HG: CPT | Performed by: INTERNAL MEDICINE

## 2024-09-19 PROCEDURE — 99214 OFFICE O/P EST MOD 30 MIN: CPT | Performed by: INTERNAL MEDICINE

## 2024-09-19 RX ORDER — ATORVASTATIN CALCIUM 20 MG/1
20 TABLET, FILM COATED ORAL NIGHTLY
Qty: 100 TABLET | Refills: 4 | Status: SHIPPED | OUTPATIENT
Start: 2024-09-19

## 2024-09-19 RX ORDER — VALSARTAN 160 MG/1
160 TABLET ORAL DAILY
Qty: 100 TABLET | Refills: 4 | Status: SHIPPED | OUTPATIENT
Start: 2024-09-19

## 2024-09-19 RX ORDER — FENOFIBRATE 160 MG/1
160 TABLET ORAL DAILY
Qty: 100 TABLET | Refills: 4 | Status: SHIPPED | OUTPATIENT
Start: 2024-09-19

## 2024-09-19 ASSESSMENT — ENCOUNTER SYMPTOMS
NAUSEA: 0
COUGH: 0
CLAUDICATION: 0
BLURRED VISION: 0
BRUISES/BLEEDS EASILY: 0
SHORTNESS OF BREATH: 0
LOSS OF CONSCIOUSNESS: 0
CHILLS: 0
BLOOD IN STOOL: 0
FEVER: 0
PALPITATIONS: 0
VOMITING: 0
MYALGIAS: 0
WEIGHT LOSS: 0
EYE PAIN: 0
SPEECH CHANGE: 0
DIZZINESS: 0
DEPRESSION: 0
SENSORY CHANGE: 0
FALLS: 0
PND: 0
EYE DISCHARGE: 0
HEADACHES: 0
HALLUCINATIONS: 0
ABDOMINAL PAIN: 0
DOUBLE VISION: 0
ORTHOPNEA: 0

## 2024-09-19 ASSESSMENT — FIBROSIS 4 INDEX: FIB4 SCORE: 1.86

## 2024-09-19 NOTE — PROGRESS NOTES
Chief Complaint   Patient presents with    Hypertension       Subjective     Choco Chavarria is a 73 y.o. male who presents today for cardiac care and management due to prior history of abnormal EKG, hypertension.    Patient was also concerned about continuing clopidogrel medical therapy which was started by his primary care physician. He was told that he might have had an old heart attack based on EKG finding. However, at that time, he did not have any invasive coronary angiogram or cardiac catheterization. He just moved to the area 5 years ago and would like to establish with cardiologist.    I have personally interpreted EKG today with patient, there is no evidence of acute coronary syndrome, no evidence of prior infarct, normal VT and QT interval, no significant conduction disease. Sinus rhythm.    I have independently interpreted and reviewed echocardiogram's actual images with patient which showed normal left ventricular systolic function. No wall motion abnormality. No evidence of pulmonary hypertension. No significant valvular disease.    No family history of sudden cardiac death.    I have independently interpreted and reviewed blood tests results with patient in clinic which shows elevated LDL level 86 down from 114, triglycerides 175 down from 268, normal renal and liver function. K of 4.3.    He did have a nuclear stress test which show no significant coronary ischemia.  However, there was evidence of frequent PVCs.    Developed side effect to Rosuvastatin. + coughs with Lisinopril.    Past Medical History:   Diagnosis Date    Apnea, sleep     Chickenpox     Daytime sleepiness     Elevated hemoglobin A1c     Frequent urination     Gasping for breath     Heart attack (HCC)     Hyperlipidemia     Hypertension     Influenza     Mumps     Obesity     Ringing in ears     Sleep apnea     Snoring     Wears glasses     Weight loss      Past Surgical History:   Procedure Laterality Date    APPENDECTOMY        Family History   Problem Relation Age of Onset    Cancer Mother     Lung Disease Mother     Psychiatric Illness Father      Social History     Socioeconomic History    Marital status:      Spouse name: Not on file    Number of children: Not on file    Years of education: Not on file    Highest education level: Not on file   Occupational History    Not on file   Tobacco Use    Smoking status: Former     Current packs/day: 0.00     Types: Cigarettes     Quit date: 3/9/1976     Years since quittin.5    Smokeless tobacco: Never   Vaping Use    Vaping status: Never Used   Substance and Sexual Activity    Alcohol use: Yes     Alcohol/week: 0.6 oz     Types: 1 Cans of beer per week     Comment: rare, LESS THAN WEEKLY    Drug use: No    Sexual activity: Yes     Partners: Female   Other Topics Concern    Not on file   Social History Narrative    Not on file     Social Determinants of Health     Financial Resource Strain: Not on file   Food Insecurity: Not on file   Transportation Needs: Not on file   Physical Activity: Not on file   Stress: Not on file   Social Connections: Not on file   Intimate Partner Violence: Not on file   Housing Stability: Not on file     Allergies   Allergen Reactions    Lisinopril     Rosuvastatin      Outpatient Encounter Medications as of 2024   Medication Sig Dispense Refill    atorvastatin (LIPITOR) 20 MG Tab Take 1 Tablet by mouth every evening. 100 Tablet 4    fenofibrate (TRIGLIDE) 160 MG tablet Take 1 Tablet by mouth every day. 100 Tablet 4    valsartan (DIOVAN) 160 MG Tab Take 1 Tablet by mouth every day. 100 Tablet 4    Misc Natural Products (ENERGY FOCUS PO) every day. FOCUS FACTOR      aspirin EC (ECOTRIN) 81 MG Tablet Delayed Response Take 81 mg by mouth every day.      B Complex Vitamins (B COMPLEX PO) Take  by mouth.      Omega-3 Fatty Acids (FISH OIL) 1000 MG Cap capsule Take 1,000 mg by mouth 2 Times a Day.      [DISCONTINUED] hydrochlorothiazide (MICROZIDE)  "12.5 MG capsule Take 1 Capsule by mouth every day. (Patient not taking: Reported on 9/19/2024) 90 Capsule 3    [DISCONTINUED] lisinopril (PRINIVIL) 20 MG Tab Take 1 Tablet by mouth every day. 100 Tablet 3     No facility-administered encounter medications on file as of 9/19/2024.     Review of Systems   Constitutional:  Negative for chills, fever, malaise/fatigue and weight loss.   HENT:  Negative for ear discharge, ear pain, hearing loss and nosebleeds.    Eyes:  Negative for blurred vision, double vision, pain and discharge.   Respiratory:  Negative for cough and shortness of breath.    Cardiovascular:  Negative for chest pain, palpitations, orthopnea, claudication, leg swelling and PND.   Gastrointestinal:  Negative for abdominal pain, blood in stool, melena, nausea and vomiting.   Genitourinary:  Negative for dysuria and hematuria.   Musculoskeletal:  Negative for falls, joint pain and myalgias.   Skin:  Negative for itching and rash.   Neurological:  Negative for dizziness, sensory change, speech change, loss of consciousness and headaches.   Endo/Heme/Allergies:  Negative for environmental allergies. Does not bruise/bleed easily.   Psychiatric/Behavioral:  Negative for depression, hallucinations and suicidal ideas.      Objective     /66 (BP Location: Left arm, Patient Position: Sitting, BP Cuff Size: Adult)   Pulse 70   Resp 16   Ht 1.753 m (5' 9\")   Wt 116 kg (256 lb)   SpO2 92%   BMI 37.80 kg/m²     Physical Exam  Vitals and nursing note reviewed.   Constitutional:       General: He is not in acute distress.     Appearance: He is not diaphoretic.   HENT:      Head: Normocephalic and atraumatic.      Right Ear: External ear normal.      Left Ear: External ear normal.   Eyes:      General:         Right eye: No discharge.         Left eye: No discharge.   Neck:      Thyroid: No thyromegaly.      Vascular: No JVD.   Cardiovascular:      Rate and Rhythm: Normal rate and regular rhythm.      Heart " sounds: Normal heart sounds. No murmur heard.     No friction rub. No gallop.   Pulmonary:      Effort: No respiratory distress.      Breath sounds: Normal breath sounds.   Abdominal:      General: Bowel sounds are normal. There is no distension.      Tenderness: There is no abdominal tenderness.   Musculoskeletal:         General: No tenderness.   Skin:     General: Skin is warm and dry.   Neurological:      Mental Status: He is alert and oriented to person, place, and time.      Cranial Nerves: No cranial nerve deficit.   Psychiatric:         Behavior: Behavior normal.       Assessment & Plan     1. Hypertriglyceridemia  atorvastatin (LIPITOR) 20 MG Tab    fenofibrate (TRIGLIDE) 160 MG tablet      2. Mixed hyperlipidemia  atorvastatin (LIPITOR) 20 MG Tab    fenofibrate (TRIGLIDE) 160 MG tablet    Basic Metabolic Panel    LIPID PANEL      3. HTN (hypertension), malignant  valsartan (DIOVAN) 160 MG Tab      4. MANNY treated with BiPAP              Medical Decision Making: Today's Assessment/Status/Plan:   Blood pressure is well controlled. Will stop Lisinopril and start Valsartan 160 mg daily.    For LDL control, will start Atorvastatin 20 mg pd qhs.  Will start Fenofibrate 160 mg daily for triglyceride control.    This visit encounter signifies the visit complexity inherent to evaluation and management associated with medical care services that serve as the continuing focal point for all needed health care services and/or with medical care services that are part of ongoing care related to this patient's single, serious condition, complex cardiac condition.    Rhonda Burton M.D.

## 2025-03-03 ENCOUNTER — HOSPITAL ENCOUNTER (OUTPATIENT)
Dept: LAB | Facility: MEDICAL CENTER | Age: 75
End: 2025-03-03
Attending: INTERNAL MEDICINE
Payer: MEDICARE

## 2025-03-03 DIAGNOSIS — E78.2 MIXED HYPERLIPIDEMIA: ICD-10-CM

## 2025-03-03 LAB
ANION GAP SERPL CALC-SCNC: 14 MMOL/L (ref 7–16)
BUN SERPL-MCNC: 16 MG/DL (ref 8–22)
CALCIUM SERPL-MCNC: 10.3 MG/DL (ref 8.5–10.5)
CHLORIDE SERPL-SCNC: 107 MMOL/L (ref 96–112)
CHOLEST SERPL-MCNC: 178 MG/DL (ref 100–199)
CO2 SERPL-SCNC: 20 MMOL/L (ref 20–33)
CREAT SERPL-MCNC: 1.06 MG/DL (ref 0.5–1.4)
GFR SERPLBLD CREATININE-BSD FMLA CKD-EPI: 74 ML/MIN/1.73 M 2
GLUCOSE SERPL-MCNC: 98 MG/DL (ref 65–99)
HDLC SERPL-MCNC: 39 MG/DL
LDLC SERPL CALC-MCNC: 106 MG/DL
POTASSIUM SERPL-SCNC: 4.6 MMOL/L (ref 3.6–5.5)
SODIUM SERPL-SCNC: 141 MMOL/L (ref 135–145)
TRIGL SERPL-MCNC: 163 MG/DL (ref 0–149)

## 2025-03-03 PROCEDURE — 80061 LIPID PANEL: CPT

## 2025-03-03 PROCEDURE — 80048 BASIC METABOLIC PNL TOTAL CA: CPT

## 2025-03-03 PROCEDURE — 36415 COLL VENOUS BLD VENIPUNCTURE: CPT

## 2025-03-05 ENCOUNTER — RESULTS FOLLOW-UP (OUTPATIENT)
Dept: CARDIOLOGY | Facility: MEDICAL CENTER | Age: 75
End: 2025-03-05
Payer: MEDICARE

## 2025-03-06 ENCOUNTER — HOSPITAL ENCOUNTER (OUTPATIENT)
Dept: LAB | Facility: MEDICAL CENTER | Age: 75
End: 2025-03-06
Attending: NURSE PRACTITIONER
Payer: MEDICARE

## 2025-03-06 ENCOUNTER — HOSPITAL ENCOUNTER (OUTPATIENT)
Dept: LAB | Facility: MEDICAL CENTER | Age: 75
End: 2025-03-06
Attending: INTERNAL MEDICINE
Payer: MEDICARE

## 2025-03-06 LAB
CHOLEST SERPL-MCNC: 164 MG/DL (ref 100–199)
FASTING STATUS PATIENT QL REPORTED: NORMAL
HDLC SERPL-MCNC: 36 MG/DL
LDLC SERPL CALC-MCNC: 98 MG/DL
TRIGL SERPL-MCNC: 152 MG/DL (ref 0–149)

## 2025-03-06 PROCEDURE — 84154 ASSAY OF PSA FREE: CPT

## 2025-03-06 PROCEDURE — 84153 ASSAY OF PSA TOTAL: CPT

## 2025-03-06 PROCEDURE — 36415 COLL VENOUS BLD VENIPUNCTURE: CPT

## 2025-03-06 PROCEDURE — 80061 LIPID PANEL: CPT

## 2025-03-08 LAB
PSA FREE MFR SERPL: 16 %
PSA FREE SERPL-MCNC: 1 NG/ML
PSA SERPL-MCNC: 6.3 NG/ML (ref 0–4)

## 2025-03-08 PROCEDURE — 84154 ASSAY OF PSA FREE: CPT

## 2025-05-20 ENCOUNTER — HOSPITAL ENCOUNTER (OUTPATIENT)
Facility: MEDICAL CENTER | Age: 75
End: 2025-05-20
Attending: UROLOGY
Payer: MEDICARE

## 2025-05-20 PROCEDURE — 87086 URINE CULTURE/COLONY COUNT: CPT

## 2025-05-23 LAB
BACTERIA UR CULT: NORMAL
SIGNIFICANT IND 70042: NORMAL
SITE SITE: NORMAL
SOURCE SOURCE: NORMAL

## 2025-05-28 ENCOUNTER — TELEPHONE (OUTPATIENT)
Dept: CARDIOLOGY | Facility: MEDICAL CENTER | Age: 75
End: 2025-05-28
Payer: MEDICARE

## 2025-05-28 NOTE — TELEPHONE ENCOUNTER
Last OV: 09.19.2024  Proposed Surgery: Transperineal Ultrasound with Prostate Needle Biopsies   Surgery Date: 06.04.20025  Requesting Office Name: [facility] [place]   Fax Number: [phone]  Preference of Location (default is surgery center unless specified by Cardiologist or ANTHONY)  Prior Clearance Addressed: No    Is this a general clearance? YES   Anticoags/Antiplatelets: Aspirin  Anticoags/Antiplatelet managed by Cardiology? YES    Outstanding Cardiac Imaging : No  Ablation, Cardioversion, Stent, Cardiac Devices, Catheterization, Watchman: No  TAVR/Valve, Mitral Clip, Watchman (including open heart),: N/A   Recent Cardiac Hospitalization: No            When: N/A  History (cardiac history): Past Medical History[1]        Is this a dental clearance? NO  Ablation, Cardioversion, Watchman, Stents, Cath, Devices within the last 3 months? No   If yes- Send dental wait letter, do not forward to provider for review.     TAVR / Valve, Mitral clip within the last 6 months? No  If yes- Send dental wait letter, do not forward to provider for review.     If completing a general clearance, continue per protocol.           Surgical Clearance Letter Sent: YES   **Scan clearance request letter into [email or web address].**        [1]   Past Medical History:  Diagnosis Date    Apnea, sleep     Chickenpox     Daytime sleepiness     Elevated hemoglobin A1c     Frequent urination     Gasping for breath     Heart attack (HCC)     Hyperlipidemia     Hypertension     Influenza     Mumps     Obesity     Ringing in ears     Sleep apnea     Snoring     Wears glasses     Weight loss

## 2025-05-28 NOTE — LETTER
PROCEDURE/SURGERY CLEARANCE FORM      Encounter Date: 5/28/2025    Patient: Choco Chavarria  YOB: 1950    CARDIOLOGIST: Rhonda Burton M.D.  Cardiovascular Disease (Cardiology)    REFERRING DOCTOR:  No ref. provider found      The above patient is cleared to have the following procedure/surgery: Transperineal Ultrasound with Prostate Needle Biopsies                                            Additional comments: PROCEDURE/SURGERY CLEARANCE FORM    Date: 5/28/2025   Patient Name: Choco Chavarria    Dear Surgeon or Proceduralist,      Thank you for your request for cardiac stratification of our mutual patient Choco Chavarria 1950. We have reviewed their [facility] records; and to the best of our understanding this patient has not had stenting, ablation, watchman, cardiothoracic surgery or hospitalization for cardiovascular reasons in the past 6 months.  Choco Chavarria has been seen within the past 15 months and is considered to have non-modifiable cardiac risk for this low-risk procedure/surgery. They may proceed from a cardiovascular standpoint and may hold their antiplatelet/anticoagulation as briefly as possible. Please have patient resume this medication when hemodynamically stable to do so.     Aspirin or Prasugrel   - hold 7 days prior to procedure/surgery, resume when hemodynamically stable      Clopidrogrel or Ticagrelor  - hold 7 days for all neurological procedures, hold 5 days prior to all other procedure/surgery,  resume when hemodynamically stable     Warfarin - hold 7 days for all neurological procedures, hold 5 days prior to all other procedure/surgery and coordinate with [facility]tico[facility] ([phone]) INR testing and dose management.      Pradaxa/Xarelto/Eliquis/Savesya - hold 1 day prior to procedure for low bleeding risk procedure, 2 days for high bleeding risk procedure, or consider holding 3 days or longer for patients with reduced kidney function  (CrCl <30mL/min) or spinal/cranial surgeries/procedures.      If they have a mechanical heart valve, please coordinate with [facility] Anticoagulation Service ([phone]) the proper management of their anticoagulant in the periprocedural or perioperative period.      Some patients have higher risk for cardiovascular complications or holding medication. If our patient has had prior complications of holding antiplatelet or anticoagulants in the past and we have seen them after these events, we have addressed these concerns with the patient. They are at an unknown degree of increased risk for recurrent complication.  You may hold anticoagulation/antiplatelets for the procedure or surgery if the benefits of the procedure or surgery outweigh this nonmodifiable risk.      If Choco Chavarria 1950 has new symptoms of heart failure decompensation, unstable arrythmia, or angina please reach out and we will assess the patient.      If you have other patient-specific concerns, please feel free to reach out to the patient's cardiologist directly at [phone].     Thank you,       [facility]              Electronically Signed         Rhonda Burton M.D.  Cardiovascular Disease (Cardiology)

## 2025-07-02 ENCOUNTER — HOSPITAL ENCOUNTER (OUTPATIENT)
Dept: LAB | Facility: MEDICAL CENTER | Age: 75
End: 2025-07-02
Attending: UROLOGY
Payer: MEDICARE

## 2025-07-02 LAB
BUN SERPL-MCNC: 20 MG/DL (ref 8–22)
CREAT SERPL-MCNC: 1.26 MG/DL (ref 0.5–1.4)
GFR SERPLBLD CREATININE-BSD FMLA CKD-EPI: 60 ML/MIN/1.73 M 2

## 2025-07-02 PROCEDURE — 36415 COLL VENOUS BLD VENIPUNCTURE: CPT

## 2025-07-02 PROCEDURE — 82565 ASSAY OF CREATININE: CPT

## 2025-07-02 PROCEDURE — 84520 ASSAY OF UREA NITROGEN: CPT

## 2025-07-09 ENCOUNTER — HOSPITAL ENCOUNTER (OUTPATIENT)
Dept: RADIOLOGY | Facility: MEDICAL CENTER | Age: 75
End: 2025-07-09
Attending: UROLOGY
Payer: MEDICARE

## 2025-07-09 DIAGNOSIS — C68.9 MALIGNANT NEOPLASM OF URINARY ORGAN (HCC): ICD-10-CM

## 2025-07-09 PROCEDURE — 74178 CT ABD&PLV WO CNTR FLWD CNTR: CPT

## 2025-07-09 PROCEDURE — 700117 HCHG RX CONTRAST REV CODE 255: Performed by: UROLOGY

## 2025-07-09 RX ADMIN — IOHEXOL 100 ML: 350 INJECTION, SOLUTION INTRAVENOUS at 12:19

## 2025-07-17 ENCOUNTER — TELEPHONE (OUTPATIENT)
Dept: CARDIOLOGY | Facility: MEDICAL CENTER | Age: 75
End: 2025-07-17
Payer: MEDICARE

## 2025-07-17 NOTE — LETTER
PROCEDURE/SURGERY CLEARANCE FORM    Date: 7/22/2025   Patient Name: Choco Chavarria    Dear Surgeon or Proceduralist,     The above patient is cleared to have the following procedure/surgery: Transurethral resection of bladder tumor with urethral Bx       Thank you for your request for cardiac stratification of our mutual patient Choco Chavarria 1950. We have reviewed their [facility] records; and to the best of our understanding this patient has not had stenting, ablation, watchman, cardiothoracic surgery or hospitalization for cardiovascular reasons in the past 6 months.  Choco Chavarria has been seen within the past 15 months and is considered to have non-modifiable cardiac risk for this low-risk procedure/surgery. They may proceed from a cardiovascular standpoint and may hold their antiplatelet/anticoagulation as briefly as possible. Please have patient resume this medication when hemodynamically stable to do so.     Aspirin or Prasugrel   - hold 7 days prior to procedure/surgery, resume when hemodynamically stable      Clopidrogrel or Ticagrelor  - hold 7 days for all neurological procedures, hold 5 days prior to all other procedure/surgery,  resume when hemodynamically stable     Warfarin - hold 7 days for all neurological procedures, hold 5 days prior to all other procedure/surgery and coordinate with [facility] ([phone]) INR testing and dose management.      Pradaxa/Xarelto/Eliquis/Savesya - hold 1 day prior to procedure for low bleeding risk procedure, 2 days for high bleeding risk procedure, or consider holding 3 days or longer for patients with reduced kidney function (CrCl <30mL/min) or spinal/cranial surgeries/procedures.      If they have a mechanical heart valve, please coordinate with [facility] Anticoagulation Service ([phone]) the proper management of their anticoagulant in the periprocedural or perioperative period.      Some patients have higher risk for  cardiovascular complications or holding medication. If our patient has had prior complications of holding antiplatelet or anticoagulants in the past and we have seen them after these events, we have addressed these concerns with the patient. They are at an unknown degree of increased risk for recurrent complication.  You may hold anticoagulation/antiplatelets for the procedure or surgery if the benefits of the procedure or surgery outweigh this nonmodifiable risk.      If Choco Chavarria 1950 has new symptoms of heart failure decompensation, unstable arrythmia, or angina please reach out and we will assess the patient.      If you have other patient-specific concerns, please feel free to reach out to the patient's cardiologist directly at [phone].     Thank you,   [facility]    __ _Electronically signed________  Provider: Rhonda Burton M.D.

## 2025-07-22 ENCOUNTER — HOSPITAL ENCOUNTER (OUTPATIENT)
Facility: MEDICAL CENTER | Age: 75
End: 2025-07-22
Attending: UROLOGY
Payer: MEDICARE

## 2025-07-22 LAB
APPEARANCE UR: ABNORMAL
BACTERIA #/AREA URNS HPF: ABNORMAL /HPF
BILIRUB UR QL STRIP.AUTO: NEGATIVE
CA OXALATE CRYSTAL  1765: PRESENT /HPF
CASTS URNS QL MICRO: ABNORMAL /LPF (ref 0–2)
COLOR UR: YELLOW
EPITHELIAL CELLS 1715: >20 /HPF (ref 0–5)
EPITHELIAL CELLS RENAL  1715R: PRESENT /HPF
GLUCOSE UR STRIP.AUTO-MCNC: NEGATIVE MG/DL
KETONES UR STRIP.AUTO-MCNC: NEGATIVE MG/DL
LEUKOCYTE ESTERASE UR QL STRIP.AUTO: NEGATIVE
MICRO URNS: ABNORMAL
NITRITE UR QL STRIP.AUTO: NEGATIVE
PH UR STRIP.AUTO: 6 [PH] (ref 5–8)
PROT UR QL STRIP: NEGATIVE MG/DL
RBC # URNS HPF: ABNORMAL /HPF (ref 0–2)
RBC UR QL AUTO: ABNORMAL
SP GR UR STRIP.AUTO: 1.02
UROBILINOGEN UR STRIP.AUTO-MCNC: 0.2 EU/DL
WBC #/AREA URNS HPF: ABNORMAL /HPF

## 2025-07-22 PROCEDURE — 81001 URINALYSIS AUTO W/SCOPE: CPT

## 2025-07-22 PROCEDURE — 87086 URINE CULTURE/COLONY COUNT: CPT

## 2025-07-22 NOTE — TELEPHONE ENCOUNTER
Last OV: 9/19/2024  Proposed Surgery: Transurethral resection of bladder tumor with urethral Bx  Surgery Date: 7/31/2025  Requesting Office Name: [facility] [place]   Fax Number: [phone]  Preference of Location (default is surgery center unless specified by Cardiologist or ANTHONY)  Prior Clearance Addressed: No    Is this a general clearance? YES   Anticoags/Antiplatelets: Aspirin  Anticoags/Antiplatelet managed by Cardiology? YES    Outstanding Cardiac Imaging : No  Ablation, Cardioversion, Stent, Cardiac Devices, Catheterization, Watchman: No  TAVR/Valve, Mitral Clip, Watchman (including open heart),: N/A   Recent Cardiac Hospitalization: No            When: Greater than 3 months since hospitalization   History (cardiac history): Past Medical History[1]        Is this a dental clearance? NO  Ablation, Cardioversion, Watchman, Stents, Cath, Devices within the last 3 months? No   If yes- Send dental wait letter, do not forward to provider for review.     TAVR / Valve, Mitral clip within the last 6 months? No  If yes- Send dental wait letter, do not forward to provider for review.     If completing a general clearance, continue per protocol.           Surgical Clearance Letter Sent: YES   **Scan clearance request letter into [employer].**        [1]   Past Medical History:  Diagnosis Date    Apnea, sleep     Chickenpox     Daytime sleepiness     Elevated hemoglobin A1c     Frequent urination     Gasping for breath     Heart attack (HCC)     Hyperlipidemia     Hypertension     Influenza     Mumps     Obesity     Ringing in ears     Sleep apnea     Snoring     Wears glasses     Weight loss

## 2025-07-24 LAB
BACTERIA UR CULT: NORMAL
SIGNIFICANT IND 70042: NORMAL
SITE SITE: NORMAL
SOURCE SOURCE: NORMAL